# Patient Record
Sex: FEMALE | Race: WHITE | NOT HISPANIC OR LATINO | Employment: FULL TIME | URBAN - METROPOLITAN AREA
[De-identification: names, ages, dates, MRNs, and addresses within clinical notes are randomized per-mention and may not be internally consistent; named-entity substitution may affect disease eponyms.]

---

## 2023-08-11 ENCOUNTER — PRE VISIT (OUTPATIENT)
Dept: MATERNAL FETAL MEDICINE | Facility: CLINIC | Age: 33
End: 2023-08-11
Payer: COMMERCIAL

## 2023-08-11 ENCOUNTER — MEDICAL CORRESPONDENCE (OUTPATIENT)
Dept: HEALTH INFORMATION MANAGEMENT | Facility: CLINIC | Age: 33
End: 2023-08-11

## 2023-08-11 ENCOUNTER — HOSPITAL ENCOUNTER (OUTPATIENT)
Dept: ULTRASOUND IMAGING | Facility: CLINIC | Age: 33
Discharge: HOME | End: 2023-08-11
Attending: FAMILY MEDICINE
Payer: COMMERCIAL

## 2023-08-11 ENCOUNTER — TRANSCRIBE ORDERS (OUTPATIENT)
Dept: MATERNAL FETAL MEDICINE | Facility: CLINIC | Age: 33
End: 2023-08-11
Payer: COMMERCIAL

## 2023-08-11 ENCOUNTER — OFFICE VISIT (OUTPATIENT)
Dept: MATERNAL FETAL MEDICINE | Facility: CLINIC | Age: 33
End: 2023-08-11
Attending: FAMILY MEDICINE
Payer: COMMERCIAL

## 2023-08-11 DIAGNOSIS — O26.90 PREGNANCY RELATED CONDITION, ANTEPARTUM: Primary | ICD-10-CM

## 2023-08-11 DIAGNOSIS — O26.90 PREGNANCY RELATED CONDITION, ANTEPARTUM: ICD-10-CM

## 2023-08-11 DIAGNOSIS — O26.879 SHORT CERVIX AFFECTING PREGNANCY: Primary | ICD-10-CM

## 2023-08-11 PROCEDURE — 76817 TRANSVAGINAL US OBSTETRIC: CPT | Mod: 26 | Performed by: OBSTETRICS & GYNECOLOGY

## 2023-08-11 PROCEDURE — 76811 OB US DETAILED SNGL FETUS: CPT | Mod: 26 | Performed by: OBSTETRICS & GYNECOLOGY

## 2023-08-11 PROCEDURE — 76811 OB US DETAILED SNGL FETUS: CPT

## 2023-08-11 RX ORDER — PROGESTERONE 200 MG/1
200 CAPSULE ORAL DAILY
Qty: 30 CAPSULE | Refills: 0 | Status: SHIPPED | OUTPATIENT
Start: 2023-08-11 | End: 2023-09-10

## 2023-08-11 NOTE — PROGRESS NOTES
"Please see \"imaging\" tab under \"Chart Review\" for details of today's US at the [facility].    Ananth Childers MD  Maternal-Fetal Medicine    "

## 2023-08-11 NOTE — NURSING NOTE
Patient reports positive fetal movement, no pain, no contractions, leaking of fluid, or bleeding.   SBAR given to DAVID REECE, see their note in Epic.    Sterile cervical exam by Dr Childers, this RN present thoughout.

## 2023-08-14 ENCOUNTER — HOSPITAL ENCOUNTER (OUTPATIENT)
Dept: ULTRASOUND IMAGING | Facility: CLINIC | Age: 33
Discharge: HOME | End: 2023-08-14
Attending: OBSTETRICS & GYNECOLOGY
Payer: COMMERCIAL

## 2023-08-14 ENCOUNTER — OFFICE VISIT (OUTPATIENT)
Dept: MATERNAL FETAL MEDICINE | Facility: CLINIC | Age: 33
End: 2023-08-14
Attending: OBSTETRICS & GYNECOLOGY
Payer: COMMERCIAL

## 2023-08-14 ENCOUNTER — HOSPITAL ENCOUNTER (OUTPATIENT)
Facility: CLINIC | Age: 33
End: 2023-08-14
Admitting: STUDENT IN AN ORGANIZED HEALTH CARE EDUCATION/TRAINING PROGRAM
Payer: COMMERCIAL

## 2023-08-14 ENCOUNTER — ANESTHESIA (OUTPATIENT)
Dept: OBGYN | Facility: CLINIC | Age: 33
End: 2023-08-14
Payer: COMMERCIAL

## 2023-08-14 ENCOUNTER — ANESTHESIA EVENT (OUTPATIENT)
Dept: OBGYN | Facility: CLINIC | Age: 33
End: 2023-08-14
Payer: COMMERCIAL

## 2023-08-14 ENCOUNTER — HOSPITAL ENCOUNTER (OUTPATIENT)
Facility: CLINIC | Age: 33
Discharge: HOME | End: 2023-08-14
Attending: STUDENT IN AN ORGANIZED HEALTH CARE EDUCATION/TRAINING PROGRAM | Admitting: STUDENT IN AN ORGANIZED HEALTH CARE EDUCATION/TRAINING PROGRAM
Payer: COMMERCIAL

## 2023-08-14 VITALS
TEMPERATURE: 98.1 F | BODY MASS INDEX: 23.25 KG/M2 | HEART RATE: 64 BPM | DIASTOLIC BLOOD PRESSURE: 54 MMHG | SYSTOLIC BLOOD PRESSURE: 108 MMHG | OXYGEN SATURATION: 97 % | WEIGHT: 157 LBS | RESPIRATION RATE: 14 BRPM | HEIGHT: 69 IN

## 2023-08-14 DIAGNOSIS — O34.32 CERVICAL INSUFFICIENCY DURING PREGNANCY IN SECOND TRIMESTER, ANTEPARTUM: Primary | ICD-10-CM

## 2023-08-14 DIAGNOSIS — O26.879 SHORT CERVIX AFFECTING PREGNANCY: ICD-10-CM

## 2023-08-14 DIAGNOSIS — O26.879 SHORT CERVIX AFFECTING PREGNANCY: Primary | ICD-10-CM

## 2023-08-14 LAB
ABO/RH(D): ABNORMAL
ALBUMIN UR-MCNC: NEGATIVE MG/DL
ANTIBODY ID: NORMAL
ANTIBODY SCREEN: POSITIVE
ANTIBODY UNIDENTIFIED: NORMAL
APPEARANCE UR: CLEAR
BASOPHILS # BLD AUTO: 0 10E3/UL (ref 0–0.2)
BASOPHILS NFR BLD AUTO: 0 %
BILIRUB UR QL STRIP: NEGATIVE
C TRACH DNA SPEC QL PROBE+SIG AMP: NEGATIVE
CLUE CELLS: ABNORMAL
COLOR UR AUTO: NORMAL
EOSINOPHIL # BLD AUTO: 0.1 10E3/UL (ref 0–0.7)
EOSINOPHIL NFR BLD AUTO: 1 %
ERYTHROCYTE [DISTWIDTH] IN BLOOD BY AUTOMATED COUNT: 13 % (ref 10–15)
GLUCOSE UR STRIP-MCNC: NEGATIVE MG/DL
HCT VFR BLD AUTO: 34.1 % (ref 35–47)
HGB BLD-MCNC: 11.9 G/DL (ref 11.7–15.7)
HGB UR QL STRIP: NEGATIVE
IMM GRANULOCYTES # BLD: 0 10E3/UL
IMM GRANULOCYTES NFR BLD: 0 %
KETONES UR STRIP-MCNC: NEGATIVE MG/DL
LEUKOCYTE ESTERASE UR QL STRIP: NEGATIVE
LYMPHOCYTES # BLD AUTO: 1.2 10E3/UL (ref 0.8–5.3)
LYMPHOCYTES NFR BLD AUTO: 22 %
MCH RBC QN AUTO: 31.4 PG (ref 26.5–33)
MCHC RBC AUTO-ENTMCNC: 34.9 G/DL (ref 31.5–36.5)
MCV RBC AUTO: 90 FL (ref 78–100)
MONOCYTES # BLD AUTO: 0.4 10E3/UL (ref 0–1.3)
MONOCYTES NFR BLD AUTO: 8 %
N GONORRHOEA DNA SPEC QL NAA+PROBE: NEGATIVE
NEUTROPHILS # BLD AUTO: 3.7 10E3/UL (ref 1.6–8.3)
NEUTROPHILS NFR BLD AUTO: 69 %
NITRATE UR QL: NEGATIVE
NRBC # BLD AUTO: 0 10E3/UL
NRBC BLD AUTO-RTO: 0 /100
PH UR STRIP: 6.5 [PH] (ref 5–7)
PLATELET # BLD AUTO: 275 10E3/UL (ref 150–450)
RBC # BLD AUTO: 3.79 10E6/UL (ref 3.8–5.2)
RBC URINE: <1 /HPF
SP GR UR STRIP: 1.01 (ref 1–1.03)
SPECIMEN EXPIRATION DATE: ABNORMAL
SPECIMEN EXPIRATION DATE: NORMAL
SQUAMOUS EPITHELIAL: <1 /HPF
TRICHOMONAS, WET PREP: ABNORMAL
UROBILINOGEN UR STRIP-MCNC: NORMAL MG/DL
WBC # BLD AUTO: 5.4 10E3/UL (ref 4–11)
WBC URINE: <1 /HPF
WBC'S/HIGH POWER FIELD, WET PREP: ABNORMAL
YEAST, WET PREP: ABNORMAL

## 2023-08-14 PROCEDURE — 360N000074 HC SURGERY LEVEL 1, PER MIN: Performed by: STUDENT IN AN ORGANIZED HEALTH CARE EDUCATION/TRAINING PROGRAM

## 2023-08-14 PROCEDURE — 86870 RBC ANTIBODY IDENTIFICATION: CPT

## 2023-08-14 PROCEDURE — 86850 RBC ANTIBODY SCREEN: CPT

## 2023-08-14 PROCEDURE — 87210 SMEAR WET MOUNT SALINE/INK: CPT

## 2023-08-14 PROCEDURE — 59320 REVISION OF CERVIX: CPT | Mod: GC | Performed by: STUDENT IN AN ORGANIZED HEALTH CARE EDUCATION/TRAINING PROGRAM

## 2023-08-14 PROCEDURE — 258N000003 HC RX IP 258 OP 636: Performed by: STUDENT IN AN ORGANIZED HEALTH CARE EDUCATION/TRAINING PROGRAM

## 2023-08-14 PROCEDURE — 272N000001 HC OR GENERAL SUPPLY STERILE: Performed by: STUDENT IN AN ORGANIZED HEALTH CARE EDUCATION/TRAINING PROGRAM

## 2023-08-14 PROCEDURE — 76817 TRANSVAGINAL US OBSTETRIC: CPT | Mod: 26 | Performed by: OBSTETRICS & GYNECOLOGY

## 2023-08-14 PROCEDURE — 250N000013 HC RX MED GY IP 250 OP 250 PS 637

## 2023-08-14 PROCEDURE — 85025 COMPLETE CBC W/AUTO DIFF WBC: CPT

## 2023-08-14 PROCEDURE — 86901 BLOOD TYPING SEROLOGIC RH(D): CPT

## 2023-08-14 PROCEDURE — 59025 FETAL NON-STRESS TEST: CPT | Mod: 26 | Performed by: OBSTETRICS & GYNECOLOGY

## 2023-08-14 PROCEDURE — 87491 CHLMYD TRACH DNA AMP PROBE: CPT

## 2023-08-14 PROCEDURE — 370N000017 HC ANESTHESIA TECHNICAL FEE, PER MIN: Performed by: STUDENT IN AN ORGANIZED HEALTH CARE EDUCATION/TRAINING PROGRAM

## 2023-08-14 PROCEDURE — 99214 OFFICE O/P EST MOD 30 MIN: CPT | Mod: 25 | Performed by: OBSTETRICS & GYNECOLOGY

## 2023-08-14 PROCEDURE — 710N000010 HC RECOVERY PHASE 1, LEVEL 2, PER MIN: Performed by: STUDENT IN AN ORGANIZED HEALTH CARE EDUCATION/TRAINING PROGRAM

## 2023-08-14 PROCEDURE — 258N000003 HC RX IP 258 OP 636

## 2023-08-14 PROCEDURE — 250N000011 HC RX IP 250 OP 636: Performed by: STUDENT IN AN ORGANIZED HEALTH CARE EDUCATION/TRAINING PROGRAM

## 2023-08-14 PROCEDURE — 999N000141 HC STATISTIC PRE-PROCEDURE NURSING ASSESSMENT: Performed by: STUDENT IN AN ORGANIZED HEALTH CARE EDUCATION/TRAINING PROGRAM

## 2023-08-14 PROCEDURE — 76817 TRANSVAGINAL US OBSTETRIC: CPT

## 2023-08-14 PROCEDURE — 87591 N.GONORRHOEAE DNA AMP PROB: CPT

## 2023-08-14 PROCEDURE — 81001 URINALYSIS AUTO W/SCOPE: CPT

## 2023-08-14 PROCEDURE — 250N000013 HC RX MED GY IP 250 OP 250 PS 637: Performed by: STUDENT IN AN ORGANIZED HEALTH CARE EDUCATION/TRAINING PROGRAM

## 2023-08-14 PROCEDURE — 87086 URINE CULTURE/COLONY COUNT: CPT

## 2023-08-14 PROCEDURE — 99207 PR NO BILLABLE SERVICE THIS VISIT: CPT | Performed by: OBSTETRICS & GYNECOLOGY

## 2023-08-14 PROCEDURE — 250N000011 HC RX IP 250 OP 636: Mod: JZ | Performed by: STUDENT IN AN ORGANIZED HEALTH CARE EDUCATION/TRAINING PROGRAM

## 2023-08-14 RX ORDER — SODIUM CHLORIDE, SODIUM LACTATE, POTASSIUM CHLORIDE, CALCIUM CHLORIDE 600; 310; 30; 20 MG/100ML; MG/100ML; MG/100ML; MG/100ML
INJECTION, SOLUTION INTRAVENOUS CONTINUOUS
Status: CANCELLED | OUTPATIENT
Start: 2023-08-14

## 2023-08-14 RX ORDER — ONDANSETRON 2 MG/ML
INJECTION INTRAMUSCULAR; INTRAVENOUS PRN
Status: DISCONTINUED | OUTPATIENT
Start: 2023-08-14 | End: 2023-08-14

## 2023-08-14 RX ORDER — ACETAMINOPHEN 325 MG/1
650 TABLET ORAL EVERY 4 HOURS PRN
Status: DISCONTINUED | OUTPATIENT
Start: 2023-08-14 | End: 2023-08-14 | Stop reason: HOSPADM

## 2023-08-14 RX ORDER — BUPIVACAINE HYDROCHLORIDE 7.5 MG/ML
INJECTION, SOLUTION INTRASPINAL
Status: COMPLETED | OUTPATIENT
Start: 2023-08-14 | End: 2023-08-14

## 2023-08-14 RX ORDER — LIDOCAINE 40 MG/G
CREAM TOPICAL
Status: CANCELLED | OUTPATIENT
Start: 2023-08-14

## 2023-08-14 RX ORDER — SODIUM CHLORIDE, SODIUM LACTATE, POTASSIUM CHLORIDE, CALCIUM CHLORIDE 600; 310; 30; 20 MG/100ML; MG/100ML; MG/100ML; MG/100ML
INJECTION, SOLUTION INTRAVENOUS CONTINUOUS
Status: DISCONTINUED | OUTPATIENT
Start: 2023-08-14 | End: 2023-08-14 | Stop reason: HOSPADM

## 2023-08-14 RX ORDER — CITRIC ACID/SODIUM CITRATE 334-500MG
30 SOLUTION, ORAL ORAL ONCE
Status: COMPLETED | OUTPATIENT
Start: 2023-08-14 | End: 2023-08-14

## 2023-08-14 RX ORDER — ACETAMINOPHEN 325 MG/1
975 TABLET ORAL ONCE
Status: CANCELLED | OUTPATIENT
Start: 2023-08-14 | End: 2023-08-14

## 2023-08-14 RX ORDER — LIDOCAINE 40 MG/G
CREAM TOPICAL
Status: DISCONTINUED | OUTPATIENT
Start: 2023-08-14 | End: 2023-08-14 | Stop reason: HOSPADM

## 2023-08-14 RX ORDER — INDOMETHACIN 50 MG/1
50 CAPSULE ORAL ONCE
Status: COMPLETED | OUTPATIENT
Start: 2023-08-14 | End: 2023-08-14

## 2023-08-14 RX ORDER — CITRIC ACID/SODIUM CITRATE 334-500MG
30 SOLUTION, ORAL ORAL ONCE
Status: CANCELLED | OUTPATIENT
Start: 2023-08-14 | End: 2023-08-14

## 2023-08-14 RX ORDER — SODIUM CHLORIDE, SODIUM LACTATE, POTASSIUM CHLORIDE, CALCIUM CHLORIDE 600; 310; 30; 20 MG/100ML; MG/100ML; MG/100ML; MG/100ML
INJECTION, SOLUTION INTRAVENOUS
Status: COMPLETED
Start: 2023-08-14 | End: 2023-08-14

## 2023-08-14 RX ORDER — ACETAMINOPHEN 325 MG/1
975 TABLET ORAL ONCE
Status: COMPLETED | OUTPATIENT
Start: 2023-08-14 | End: 2023-08-14

## 2023-08-14 RX ORDER — ASPIRIN 81 MG/1
81 TABLET ORAL DAILY
Status: ON HOLD | COMMUNITY
End: 2023-11-23

## 2023-08-14 RX ADMIN — SODIUM CITRATE AND CITRIC ACID MONOHYDRATE 30 ML: 500; 334 SOLUTION ORAL at 12:02

## 2023-08-14 RX ADMIN — ACETAMINOPHEN 975 MG: 325 TABLET, FILM COATED ORAL at 11:56

## 2023-08-14 RX ADMIN — SODIUM CHLORIDE, SODIUM LACTATE, POTASSIUM CHLORIDE, CALCIUM CHLORIDE: 600; 310; 30; 20 INJECTION, SOLUTION INTRAVENOUS at 11:32

## 2023-08-14 RX ADMIN — INDOMETHACIN 50 MG: 50 CAPSULE ORAL at 17:36

## 2023-08-14 RX ADMIN — BUPIVACAINE HYDROCHLORIDE IN DEXTROSE 1.2 ML: 7.5 INJECTION, SOLUTION SUBARACHNOID at 13:42

## 2023-08-14 RX ADMIN — ACETAMINOPHEN 650 MG: 325 TABLET, FILM COATED ORAL at 16:11

## 2023-08-14 RX ADMIN — PHENYLEPHRINE HYDROCHLORIDE 50 MCG/MIN: 10 INJECTION INTRAVENOUS at 13:38

## 2023-08-14 RX ADMIN — SODIUM CHLORIDE, POTASSIUM CHLORIDE, SODIUM LACTATE AND CALCIUM CHLORIDE: 600; 310; 30; 20 INJECTION, SOLUTION INTRAVENOUS at 11:32

## 2023-08-14 RX ADMIN — ONDANSETRON 4 MG: 2 INJECTION INTRAMUSCULAR; INTRAVENOUS at 14:10

## 2023-08-14 ASSESSMENT — ACTIVITIES OF DAILY LIVING (ADL)
FALL_HISTORY_WITHIN_LAST_SIX_MONTHS: NO
CONCENTRATING,_REMEMBERING_OR_MAKING_DECISIONS_DIFFICULTY: NO
DOING_ERRANDS_INDEPENDENTLY_DIFFICULTY: NO
ADLS_ACUITY_SCORE: 31
ADLS_ACUITY_SCORE: 18
TOILETING_ISSUES: NO
ADLS_ACUITY_SCORE: 18
WALKING_OR_CLIMBING_STAIRS_DIFFICULTY: NO
ADLS_ACUITY_SCORE: 18
ADLS_ACUITY_SCORE: 18
WEAR_GLASSES_OR_BLIND: NO
DRESSING/BATHING_DIFFICULTY: NO
CHANGE_IN_FUNCTIONAL_STATUS_SINCE_ONSET_OF_CURRENT_ILLNESS/INJURY: NO
DIFFICULTY_EATING/SWALLOWING: NO

## 2023-08-14 NOTE — PROVIDER NOTIFICATION
08/14/23 1400   Output (mL)   Straight cath   (bladder emptied during cerclage procedure by providers.)

## 2023-08-14 NOTE — PROVIDER NOTIFICATION
08/14/23 1200   Fetal Assessment   Fetal HR Assessment Method intermittent auscultation, using Doppler;other (see comments)   Fetal HR (beats/min) 160     Used doptone to auscultate FHR 20.4 weeks prior to cerclage procedure

## 2023-08-14 NOTE — H&P
[facility] OB HISTORY AND PHYSICAL    Patient: Dina Pena   MRN#: [id number]  YOB: 1990     HPI: Dina Pena is a 33 year old  at 20w4d by 7w0d US who presents today from [facility] clinic following a repeat TVUS showing a 7mm cervical length. Dina has been placing progesterone vaginally daily since US showing short cervix on .    She reports good fetal movement. Denies LOF, vaginal bleeding, or contractions.      She denies fever, chills, headache, vision changes, SOB, chest pain, palpitations, nausea, emesis, RUQ pain, epigastric pain, dysuria, change in vaginal discharge, LE swelling/tenderness.     Pregnancy notable for:   - Cervical Insufficiency  - L4-L5 Lumbar fusion, received Epidural in labor    Her previous pregnancies were notable for a primary  section for fetal indications after progressing to complete dilation. The surgery was noted to be uncomplicated without any extensions.Denies history of postpartum hemorrhage, shoulder dystocia, pre-eclampsia.     No history of asthma or high blood pressure.    OBGYN History:    s/p  OB History    Para Term  AB Living   2 1 1 0 0 1   SAB IAB Ectopic Multiple Live Births   0 0 0 0 1      # Outcome Date GA Lbr Uli/2nd Weight Sex Delivery Anes PTL Lv   2 Current            1 Term 22 39w6d    CS-LTranv   KENNEDY     Prenatal Lab Results:  Lab Results   Component Value Date    HGB 14.0 2012       GBS Status:   No results found for: GBS    There are no problems to display for this patient.      Past Medical History  Past Medical History:   Diagnosis Date    Acquired spondylolisthesis     acquired lumbar    L4-L5 disc bulge     Spine fracture 10/2011     Past Surgical History  Past Surgical History:   Procedure Laterality Date    FUSION SPINE POSTERIOR ONE LEVEL  2011    L5-S1 fracture    OPEN REDUCTION INTERNAL FIXATION WRIST Left 2016    Procedure: OPEN REDUCTION INTERNAL FIXATION WRIST;   "Surgeon: Isra Hu MD;  Location: [facility] OR    THROAT SURGERY  2009    For abscess     Family History  Family History   Problem Relation Age of Onset    Heart Disease Paternal Grandmother         Heart disease    Heart Disease Paternal Grandfather         Heart disease    Heart Disease Paternal Grandmother     Heart Disease Paternal Grandfather      Social History  Social History     Tobacco Use    Smoking status: Never    Smokeless tobacco: Never   Substance Use Topics    Alcohol use: No     Medications  Medications Prior to Admission   Medication Sig Dispense Refill Last Dose    aspirin 81 MG EC tablet Take 81 mg by mouth daily   8/13/2023    hydrOXYzine (ATARAX) 25 MG tablet Take 1 tablet (25 mg) by mouth every 4 hours as needed (muscle spasms, nausea) 60 tablet 0 Unknown    oxyCODONE-acetaminophen (PERCOCET) 5-325 MG per tablet Take 1-2 tablets by mouth every 4 hours as needed for pain (moderate to severe) 80 tablet 0 Unknown    Prenatal Vit-Fe Fumarate-FA (PRENATAL MULTIVITAMIN  PLUS IRON) 27-1 MG TABS Take by mouth daily   8/13/2023    progesterone (PROMETRIUM) 200 MG capsule Place 1 capsule (200 mg) vaginally daily for 30 days 30 capsule 0 8/13/2023    senna-docusate (SENOKOT-S;PERICOLACE) 8.6-50 MG per tablet Take 1-2 tablets by mouth daily as needed for constipation 15 tablet 0 Unknown    SPIRONOLACTONE PO    Unknown     Allergies  Allergies   Allergen Reactions    Nka [No Known Allergies]         REVIEW OF SYSTEMS:  A 10 point review of systems was completed and was negative other than as noted in the HPI.    PHYSICAL EXAM  BP 99/54 (BP Location: Left arm, Patient Position: Semi-Stearns's, Cuff Size: Adult Regular)   Pulse 57   Temp 97.8  F (36.6  C) (Oral)   Resp 16   Ht 1.753 m (5' 9\")   LMP 03/23/2023   BMI 19.20 kg/m    Gen: NAD  CV: RRR, nl S1/S2, no murmurs/clicks/gallops  Lungs: CTAB, non-labored breathing  Abd: Gravid, non-tender, non-distended  Ext: trace peripheral extremity edema; "     SSE: Closed, No pooling or vaginal bleeding. White mucus around cervix  SVE: Fingertip  - Exam chaperoned by RN  Membranes: Intact    FHT:  Monitoring External  Doptones: 145  TOCO No contractions in 10 minutes    Studies: T&S, CBC, RPR, Wet prep, GC/CT, UA and Urine Culture.    Assessment & Plan: 33 year old  at 20w4d by 7w0d US admitted for ultrasound indicated cerclage after incidental finding of 10mm cervix on comprehensive scan in [facility].  Pregnancy is notable for a history of .     # Cervical Insufficiency  We reviewed the findings of her ultrasound and speculum exam. We confirmed that she has no prior history of  delivery, has not had any cervical procedures and had no cervical extensions during her primary  section. She has no si/sx of  labor or overt infection. We discussed that her ultrasound and exam findings are very concerning and put her at significant risk for  delivery. We reviewed her current usage of vaginal progesterone and recommendation for US indicated cerclage based on best available evidence. We reviewed that although this may be her best chance at pregnancy prolongation this management option also carries the most risk. We reviewed that ultrasound indicated cerclage can be complicated by infection, bleeding, premature rupture of membranes, and failure or inability to perform the procedure. We discussed that even in the setting of a successful cerclage placement she could still have a previable or  delivery. Finally, we reviewed that if rupture or progression in her cervical dilation were to occur and she were to deliver at 20w4d weeks there would be nothing we could offer to her in terms of  resuscitation. We briefly reviewed that the absolute earliest intervention would be possible is 22 weeks however based on outcomes although intervention is possible it is not always what families choose.     Based on our entire discussion she is  interested in proceeding with ultrasound indicated cerclage placement. Plan to admit to L&D, NPO with maintenance IV fluids.    Plan for Discharge home following post operative goals.    Patient discussed with Dr. Anna Kern MD, MPH  Ob/Gyn Resident, PGY-3  08/14/23 10:45 AM

## 2023-08-14 NOTE — ANESTHESIA CARE TRANSFER NOTE
Patient: Dina Pena    Procedure: Procedure(s):  Cerclage cervical       Diagnosis: * No pre-op diagnosis entered *  Diagnosis Additional Information: No value filed.    Anesthesia Type:   Spinal     Note:    Oropharynx: oropharynx clear of all foreign objects and spontaneously breathing  Level of Consciousness: awake  Oxygen Supplementation: room air    Independent Airway: airway patency satisfactory and stable  Dentition: dentition unchanged  Vital Signs Stable: post-procedure vital signs reviewed and stable  Report to RN Given: handoff report given  Patient transferred to: PACU    Handoff Report: Identifed the Patient, Identified the Reponsible Provider, Reviewed the pertinent medical history, Discussed the surgical course, Reviewed Intra-OP anesthesia mangement and issues during anesthesia, Set expectations for post-procedure period and Allowed opportunity for questions and acknowledgement of understanding      Vitals:  Vitals Value Taken Time   BP 99/55 08/14/23 1436   Temp     Pulse 54 08/14/23 1447   Resp 22 08/14/23 1447   SpO2 100 % 08/14/23 1447   Vitals shown include unvalidated device data.    Electronically Signed By: Patrice Patiño MD  August 14, 2023  2:48 PM

## 2023-08-14 NOTE — PROVIDER NOTIFICATION
08/14/23 1530   Fetal Assessment   Fetal Movement active   Fetal HR Assessment Method other (see comments)   Fetal HR (beats/min) 160     Used doptone to auscultate FHR post cerclage procedure.

## 2023-08-14 NOTE — DISCHARGE SUMMARY
[facility] Discharge Summary    Dina Pena MRN# [id number]   Age: 33 year old YOB: 1990     Date of Admission:  8/14/2023  Date of Discharge::  8/14/2023   Admitting Physician:  Edna Castillo MD  Discharge Physician:  Radha Vuong MD            Admission Diagnoses:   1) Intrauterine pregnancy at 20w4d  2) Cervical insufficiency, cervical length measuring 7 mm on ultrasound           Discharge Diagnosis:   Same            Procedures:     Procedure(s):  Exam indicated Landin's cerclage, 1 stitch at the 12 o'clock position       No other procedures performed during this admission           Medications Prior to Admission:     Medications Prior to Admission   Medication Sig Dispense Refill Last Dose    aspirin 81 MG EC tablet Take 81 mg by mouth daily   8/13/2023    hydrOXYzine (ATARAX) 25 MG tablet Take 1 tablet (25 mg) by mouth every 4 hours as needed (muscle spasms, nausea) 60 tablet 0 Unknown    oxyCODONE-acetaminophen (PERCOCET) 5-325 MG per tablet Take 1-2 tablets by mouth every 4 hours as needed for pain (moderate to severe) 80 tablet 0 Unknown    Prenatal Vit-Fe Fumarate-FA (PRENATAL MULTIVITAMIN  PLUS IRON) 27-1 MG TABS Take by mouth daily   8/13/2023    progesterone (PROMETRIUM) 200 MG capsule Place 1 capsule (200 mg) vaginally daily for 30 days 30 capsule 0 8/13/2023    senna-docusate (SENOKOT-S;PERICOLACE) 8.6-50 MG per tablet Take 1-2 tablets by mouth daily as needed for constipation 15 tablet 0 Unknown    [DISCONTINUED] SPIRONOLACTONE PO    Unknown             Discharge Medications:     Current Discharge Medication List        CONTINUE these medications which have NOT CHANGED    Details   aspirin 81 MG EC tablet Take 81 mg by mouth daily      hydrOXYzine (ATARAX) 25 MG tablet Take 1 tablet (25 mg) by mouth every 4 hours as needed (muscle spasms, nausea)  Qty: 60 tablet, Refills: 0    Associated Diagnoses: Other closed extra-articular fracture of distal end of left  radius with malunion      oxyCODONE-acetaminophen (PERCOCET) 5-325 MG per tablet Take 1-2 tablets by mouth every 4 hours as needed for pain (moderate to severe)  Qty: 80 tablet, Refills: 0    Associated Diagnoses: Other closed extra-articular fracture of distal end of left radius with malunion      Prenatal Vit-Fe Fumarate-FA (PRENATAL MULTIVITAMIN  PLUS IRON) 27-1 MG TABS Take by mouth daily      progesterone (PROMETRIUM) 200 MG capsule Place 1 capsule (200 mg) vaginally daily for 30 days  Qty: 30 capsule, Refills: 0    Associated Diagnoses: Short cervix affecting pregnancy      senna-docusate (SENOKOT-S;PERICOLACE) 8.6-50 MG per tablet Take 1-2 tablets by mouth daily as needed for constipation  Qty: 15 tablet, Refills: 0    Comments: While on narcotics  Associated Diagnoses: Other closed extra-articular fracture of distal end of left radius with malunion           STOP taking these medications       SPIRONOLACTONE PO Comments:   Reason for Stopping:                     Consultations:   No consultations were requested during this admission          Brief History of Illness:   Refer to H&P for further details           Hospital Course:     The patient underwent a Landin cerclage. Post-operatively, she recovered well in the postoperative recovery area without issues. Fetal Doptone was done confirming fetal viability.   She was discharged home in stable condition.            Discharge Instructions and Follow-Up:     Discharge diet: Regular   Discharge activity: Activity as tolerated. Pelvic rest.    Discharge follow-up: Follow up with primary OB/Gyn provider in 1-2 weeks.    Wound care N/A           Discharge Disposition:     Discharged to home      Attestation:  I have reviewed today's vital signs, notes, medications, labs and imaging.    Salbador Leggett MD

## 2023-08-14 NOTE — NURSING NOTE
D: Patient given directions to L&D left clinic ambulatory. L&D charge and Dr. Castillo aware that patient is coming.   Lizbet Childers RN     Have You Had Botox Before?: has had botox When Was Your Last Botox Treatment?: 11/11/2021

## 2023-08-14 NOTE — DISCHARGE INSTRUCTIONS
Discharge Instructions for Undelivered Patients    Diet:  * Drink 8 to 12 glasses of liquids (milk, juice, water) every day  * You may eat meals and snacks.    Activity:  * Rest the pelvic area. No sex.    Call your provider if you notice:  * Swelling in your face or increased swelling in your hands or legs.  * Headaches that are not relieved by Tylenol (acetaminophen).  * Changes in your vision (blurring; seeing spots or stars).  * Nausea (sick to your stomach) and vomiting (throwing up).  * Weight gain of 5 pounds per week.  * Heartburn that doesn't go away.  * Signs of bladder infection: Pain when you urinate (use the toilet), needing to go more often or more urgently.  * The bag of kaufman (membrane) breaks, or you notice leaking in your underwear.  * Bright red blood in your underwear.  * Abdominal (lower belly) or stomach pain.  * For first baby: Contractions (tightenings) less than 5 minutes apart for one hour or more.  * Second (plus) baby: Contractions (tightenings) less than 10 minutes apart and getting stronger.  * Increase or change in vaginal discharge (note the color and amount).    ***

## 2023-08-14 NOTE — ANESTHESIA PREPROCEDURE EVALUATION
Anesthesia Pre-Procedure Evaluation    Patient: Dina Pena   MRN: [id number] : 1990        Procedure : Procedure(s):  Cerclage cervical          Past Medical History:   Diagnosis Date    Acquired spondylolisthesis     acquired lumbar    L4-L5 disc bulge     Spine fracture 10/2011      Past Surgical History:   Procedure Laterality Date    FUSION SPINE POSTERIOR ONE LEVEL  2011    L5-S1 fracture    OPEN REDUCTION INTERNAL FIXATION WRIST Left 2016    Procedure: OPEN REDUCTION INTERNAL FIXATION WRIST;  Surgeon: Isra Hu MD;  Location: [facility] OR    THROAT SURGERY      For abscess      Allergies   Allergen Reactions    Nka [No Known Allergies]       Social History     Tobacco Use    Smoking status: Never    Smokeless tobacco: Never   Substance Use Topics    Alcohol use: No      Wt Readings from Last 1 Encounters:   16 59 kg (130 lb)        Anesthesia Evaluation   Pt has had prior anesthetic. Type: General and OB Labor Epidural.    No history of anesthetic complications       ROS/MED HX  ENT/Pulmonary:  - neg pulmonary ROS  (-) asthma   Neurologic: Comment: # hx one level lumbar spinal fusion, believes epidural was placed above this      Cardiovascular:  - neg cardiovascular ROS  (-) PIH   METS/Exercise Tolerance:     Hematologic: Comments: Started ASA 81 and progesterone ~ 1w ago    (+)   no coagulopathy,             Musculoskeletal:   (+)         lumbar spine surgery,      GI/Hepatic:    (-) GERD   Renal/Genitourinary:       Endo:  - neg endo ROS     Psychiatric/Substance Use:       Infectious Disease:       Malignancy:       Other:    at 20w4d  Epidural to Csx prior delivery with n/v   (+)  , ,previous          Physical Exam    Airway        Mallampati: I   TM distance: > 3 FB   Neck ROM: full   Mouth opening: > 3 cm    Respiratory Devices and Support         Dental  no notable dental history     (+) Completely normal teeth      Cardiovascular    cardiovascular exam normal          Pulmonary   pulmonary exam normal                OUTSIDE LABS:  CBC:   Lab Results   Component Value Date    WBC 6.3 12/18/2012    WBC 6.1 10/17/2012    HGB 14.0 12/18/2012    HGB 12.6 10/17/2012    HCT 41.0 12/18/2012    HCT 37.8 10/17/2012     12/18/2012     10/17/2012     BMP:   Lab Results   Component Value Date     12/18/2012     10/17/2012    POTASSIUM 4.0 12/18/2012    POTASSIUM 4.2 10/17/2012    CHLORIDE 102 12/18/2012    CHLORIDE 105 10/17/2012    CO2 25 12/18/2012    CO2 24 10/17/2012    BUN 8 12/18/2012    BUN 18 10/17/2012    CR 0.67 12/18/2012    CR 0.72 10/17/2012    GLC 76 12/18/2012    GLC 92 10/17/2012     COAGS:   Lab Results   Component Value Date    INR 0.98 10/17/2012     POC:   Lab Results   Component Value Date    HCG Negative 04/27/2016     HEPATIC: No results found for: ALBUMIN, PROTTOTAL, ALT, AST, GGT, ALKPHOS, BILITOTAL, BILIDIRECT, MARC  OTHER:   Lab Results   Component Value Date    LAURA 9.0 12/18/2012       Anesthesia Plan    ASA Status:  2    NPO Status:  ELEVATED Aspiration Risk/Unknown    Anesthesia Type: Spinal.              Consents    Anesthesia Plan(s) and associated risks, benefits, and realistic alternatives discussed. Questions answered and patient/representative(s) expressed understanding.     - Discussed: Risks, Benefits and Alternatives for BOTH SEDATION and the PROCEDURE were discussed     - Discussed with:  Patient, Spouse      - Extended Intubation/Ventilatory Support Discussed: No.      - Patient is DNR/DNI Status: No     Use of blood products discussed: No .     Postoperative Care    Pain management: Neuraxial analgesia.        Comments:           H&P reviewed: Unable to attach VIRTUAL H&P to encounter due to EHR limitations. Appropriate H&P reviewed. The physical exam performed by anesthesia during this surgical encounter serves as the physical portion of that virtual H&P.  Any significant changes noted  within this preop evaluation.          Patrice Patiño MD

## 2023-08-14 NOTE — OP NOTE
Operative Note: Cervical Cerclage         Pre-Op Diagnosis:   1) Single intrauterine pregnancy at 20w4d by LMP consistent with 7w0d  2) Cervical insufficiency by ultrasound and physical examination          Post-Op Diagnosis:   1) Same         Procedure:   1) Landin's cervical cerclage, #2 Ethilon sutures (knot tied at 12 o'clock position)         Surgeons:   Attending: Radha Vuong MD  Fellow: Salbador Leggett MD, MD  Medical Student: Re Ritter MS4          Anesthesia:   Spinal          Estimated Blood Loss:   5cc         Findings:   1) Cervix fingertip dilated prior to the procedure, closed following the procedure  2) Fetal cardiac activity confirmed by bedside ultrasound at 152 beats per minute         Specimens:   1) None         Complications:   1) None apparent          History:     Dina Pena is a 33 year oldy.o.  at 20w4d by LMP consistent with 7w0d ultrasound who presents for ultrasound/exam indicated cerclage for cervical insufficiency with cervical length measuring 7 mm.   After counseling regarding these findings, the patient has decided to proceed with exam indicated cerclage placement.         Details of Procedure:     After administration of spinal anesthesia the patient was placed in the dorsal lithotomy position and prepped and draped in the usual fashion.  A weighted speculum was placed into the vagina and right angle retractors were used to visualize the cervix. The cervix appeared visually closed. The vagina and cervix were copiously cleansed with betadine solution. The bladder was then straight catheterized. Next, a circumferential suture of #2 Ethilon was placed in the usual fashion at the cervicovaginal reflection with knot tied at 12 o'clock position.    Both sutures were securely tied down and digital exam confirmed that the cervix was closed.  The stay suture was then removed, the cervix was examined and found to be tightly closed.     The bladder was drained  of clear urine and a rectal exam confirmed that no sutures were present in the rectum. The cervix and vaginal vault were inspected and noted to be free of injury and hemostatic.      The instruments were removed from the vagina. She tolerated her spinal anesthesia well without incident. She was subsequently transferred to the recovery room in satisfactory condition.     Sponge and needle counts were correct at the close of the case x 2.    Salbador Leggett MD  Maternal Fetal Medicine Fellow  8/14/2023 2:49 PM

## 2023-08-14 NOTE — ANESTHESIA POSTPROCEDURE EVALUATION
Patient: Dina Pena    Procedure: Procedure(s):  Cerclage cervical       Anesthesia Type:  Spinal    Note:  Disposition: Inpatient   Postop Pain Control: Uneventful            Sign Out: Well controlled pain   PONV: No   Neuro/Psych: Uneventful            Sign Out: Acceptable/Baseline neuro status   Airway/Respiratory: Uneventful            Sign Out: Acceptable/Baseline resp. status   CV/Hemodynamics: Uneventful            Sign Out: Acceptable CV status; No obvious hypovolemia; No obvious fluid overload   Other NRE: NONE   DID A NON-ROUTINE EVENT OCCUR? No           Last vitals:  Vitals Value Taken Time   /63 08/14/23 1529   Temp 36.7  C (98.1  F) 08/14/23 1445   Pulse 63 08/14/23 1531   Resp 15 08/14/23 1531   SpO2 98 % 08/14/23 1531   Vitals shown include unvalidated device data.    Electronically Signed By: Patrice Patiño MD  August 14, 2023  3:48 PM

## 2023-08-14 NOTE — PLAN OF CARE
Pt transferred to  456 from PACU following cerclage procedure. Vitals stable. No bleeding. . Pt able to walk with assistance. Pt ate dinner without nausea or vomiting. Unable to void. Straight cathed for 200 ml. Pt states her pain has worsened since her spinal has worn off. Pt received Indomethacin. Will continue to monitor for pain and ability to urinate.

## 2023-08-14 NOTE — ANESTHESIA PROCEDURE NOTES
"Intrathecal injection Procedure Note    Pre-Procedure   Staff -        Anesthesiologist:  Radha Christian MD       Resident/Fellow: Patrice Patiño MD       Performed By: resident       Location: OB       Pre-Anesthestic Checklist: patient identified, IV checked, risks and benefits discussed, informed consent, monitors and equipment checked, pre-op evaluation, at physician/surgeon's request and post-op pain management  Timeout:       Correct Patient: Yes        Correct Procedure: Yes        Correct Site: Yes        Correct Position: Yes   Procedure Documentation  Procedure: intrathecal injection       Patient Position: sitting       Patient Prep/Sterile Barriers: sterile gloves, mask, patient draped       Skin prep: Chloraprep       Insertion Site: L2-3. (midline approach).       Needle Gauge: 25.        Needle Length (Inches): 3.5        Spinal Needle Type: Pencan       Introducer used       Introducer: 20 G       # of attempts: 1 and  # of redirects:  0    Assessment/Narrative         Paresthesias: No.       CSF fluid: clear.    Medication(s) Administered   0.75% Hyperbaric Bupivacaine (Intrathecal) - Intrathecal   1.2 mL - 8/14/2023 1:42:00 PM    FOR [facility] ([facility]) ONLY:   Pain Team Contact information: please page the Pain Team [address]. Search \"Pain\". During daytime hours, please page the attending first. At night please page the resident first.      "

## 2023-08-15 LAB — BACTERIA UR CULT: NORMAL

## 2023-08-15 NOTE — PLAN OF CARE
Pain improved after indomethacin. Pt able to void. Discharge instructions explained and understood. Discharge to home.

## 2023-08-28 ENCOUNTER — TRANSFERRED RECORDS (OUTPATIENT)
Dept: HEALTH INFORMATION MANAGEMENT | Facility: CLINIC | Age: 33
End: 2023-08-28
Payer: COMMERCIAL

## 2023-08-29 ENCOUNTER — HOSPITAL ENCOUNTER (EMERGENCY)
Facility: CLINIC | Age: 33
End: 2023-08-29
Admitting: OBSTETRICS & GYNECOLOGY
Payer: COMMERCIAL

## 2023-08-29 ENCOUNTER — HOSPITAL ENCOUNTER (EMERGENCY)
Facility: CLINIC | Age: 33
Discharge: HOME | End: 2023-08-29
Attending: OBSTETRICS & GYNECOLOGY | Admitting: OBSTETRICS & GYNECOLOGY
Payer: COMMERCIAL

## 2023-08-29 VITALS — RESPIRATION RATE: 16 BRPM | SYSTOLIC BLOOD PRESSURE: 104 MMHG | DIASTOLIC BLOOD PRESSURE: 56 MMHG | TEMPERATURE: 97.9 F

## 2023-08-29 LAB
ALBUMIN UR-MCNC: NEGATIVE MG/DL
APPEARANCE UR: CLEAR
BILIRUB UR QL STRIP: NEGATIVE
C TRACH DNA SPEC QL PROBE+SIG AMP: NEGATIVE
CLUE CELLS: NORMAL
COLOR UR AUTO: ABNORMAL
ERYTHROCYTE [DISTWIDTH] IN BLOOD BY AUTOMATED COUNT: 13.2 % (ref 10–15)
GLUCOSE UR STRIP-MCNC: NEGATIVE MG/DL
HCT VFR BLD AUTO: 34.5 % (ref 35–47)
HGB BLD-MCNC: 12.1 G/DL (ref 11.7–15.7)
HGB UR QL STRIP: NEGATIVE
KETONES UR STRIP-MCNC: NEGATIVE MG/DL
LEUKOCYTE ESTERASE UR QL STRIP: NEGATIVE
MCH RBC QN AUTO: 31.7 PG (ref 26.5–33)
MCHC RBC AUTO-ENTMCNC: 35.1 G/DL (ref 31.5–36.5)
MCV RBC AUTO: 90 FL (ref 78–100)
N GONORRHOEA DNA SPEC QL NAA+PROBE: NEGATIVE
NITRATE UR QL: NEGATIVE
PH UR STRIP: 7.5 [PH] (ref 5–7)
PLATELET # BLD AUTO: 195 10E3/UL (ref 150–450)
RBC # BLD AUTO: 3.82 10E6/UL (ref 3.8–5.2)
RBC URINE: 1 /HPF
SP GR UR STRIP: 1 (ref 1–1.03)
SQUAMOUS EPITHELIAL: 1 /HPF
TRICHOMONAS, WET PREP: NORMAL
UROBILINOGEN UR STRIP-MCNC: NORMAL MG/DL
WBC # BLD AUTO: 7.1 10E3/UL (ref 4–11)
WBC URINE: <1 /HPF
WBC'S/HIGH POWER FIELD, WET PREP: NORMAL
YEAST, WET PREP: NORMAL

## 2023-08-29 PROCEDURE — 87210 SMEAR WET MOUNT SALINE/INK: CPT

## 2023-08-29 PROCEDURE — 258N000003 HC RX IP 258 OP 636

## 2023-08-29 PROCEDURE — 999N000105 HC STATISTIC NO DOCUMENTATION TO SUPPORT CHARGE

## 2023-08-29 PROCEDURE — G0463 HOSPITAL OUTPT CLINIC VISIT: HCPCS | Mod: 25

## 2023-08-29 PROCEDURE — 87591 N.GONORRHOEAE DNA AMP PROB: CPT

## 2023-08-29 PROCEDURE — 59025 FETAL NON-STRESS TEST: CPT | Mod: 26 | Performed by: OBSTETRICS & GYNECOLOGY

## 2023-08-29 PROCEDURE — 87491 CHLMYD TRACH DNA AMP PROBE: CPT

## 2023-08-29 PROCEDURE — 87653 STREP B DNA AMP PROBE: CPT

## 2023-08-29 PROCEDURE — 99214 OFFICE O/P EST MOD 30 MIN: CPT | Mod: 25 | Performed by: OBSTETRICS & GYNECOLOGY

## 2023-08-29 PROCEDURE — 85014 HEMATOCRIT: CPT

## 2023-08-29 PROCEDURE — 81001 URINALYSIS AUTO W/SCOPE: CPT

## 2023-08-29 PROCEDURE — 96360 HYDRATION IV INFUSION INIT: CPT

## 2023-08-29 PROCEDURE — 999N000104 HC STATISTIC NO CHARGE

## 2023-08-29 RX ORDER — METRONIDAZOLE 500 MG/1
500 TABLET ORAL 2 TIMES DAILY
Status: ON HOLD | COMMUNITY
End: 2023-09-07

## 2023-08-29 RX ORDER — SODIUM CHLORIDE 9 MG/ML
INJECTION, SOLUTION INTRAVENOUS
Status: COMPLETED
Start: 2023-08-29 | End: 2023-08-29

## 2023-08-29 RX ADMIN — SODIUM CHLORIDE 1000 ML: 9 INJECTION, SOLUTION INTRAVENOUS at 07:10

## 2023-08-29 ASSESSMENT — ACTIVITIES OF DAILY LIVING (ADL)
ADLS_ACUITY_SCORE: 31
ADLS_ACUITY_SCORE: 31

## 2023-08-29 NOTE — PROGRESS NOTES
[facility]  Labor & Delivery Triage Note    HPI: Dina Pena is a 33 year old  at 22w5d by 7w0d US who presents today for evaluation of uterine cramping. Patient reports having an onset of contractions around 0300 this morning that woke her up from sleep. States that she got out of bed, tried to drink water, rub her lower abdomen, and stay seated.  None of these measures helped to resolve her symptoms. Given this she decided to present to triage area for evaluation.     Currently patient states that she continues to feel uterine contractions. She reports feeling good fetal movement - more than baseline. She reports being diagnosed yesterday with BV after noticing increased discharge/fluid over the weekend. She was started on Flagyl 500 BID of which she took one dose last night. She denies nelsy LOF, or vaginal bleeding. No other complaints at this time.     Pregnancy notable for:  - Cervical Insufficiency, s/p Landin cerclage   - L4/L5 fusion  - Positive blood antibody, unidentified    PNC labs:  Lab Results   Component Value Date    AS Positive (A) 2023    HGB 11.9 2023     GBS status: n/a    OBHX:  OB History    Para Term  AB Living   2 1 1 0 0 1   SAB IAB Ectopic Multiple Live Births   0 0 0 0 1      # Outcome Date GA Lbr Uli/2nd Weight Sex Delivery Anes PTL Lv   2 Current            1 Term 22 39w6d    CS-LTranv   KENNEDY     Medical Hx:  Past Medical History:   Diagnosis Date    Acquired spondylolisthesis     acquired lumbar    L4-L5 disc bulge     Spine fracture 10/2011     Surgical Hx:  Past Surgical History:   Procedure Laterality Date    CERCLAGE CERVICAL N/A 2023    Procedure: Cerclage cervical;  Surgeon: Radha Vuong MD;  Location: [facility] L[facility]    FUSION SPINE POSTERIOR ONE LEVEL  2011    L5-S1 fracture    OPEN REDUCTION INTERNAL FIXATION WRIST Left 2016    Procedure: OPEN REDUCTION INTERNAL FIXATION  WRIST;  Surgeon: Isra Hu MD;  Location: [facility] OR    THROAT SURGERY  2009    For abscess       Medications:  Current Outpatient Medications   Medication Instructions    aspirin 81 mg, Oral, DAILY    hydrOXYzine (ATARAX) 25 mg, Oral, EVERY 4 HOURS PRN    metroNIDAZOLE (FLAGYL) 500 mg, Oral, 2 TIMES DAILY    oxyCODONE-acetaminophen (PERCOCET) 5-325 MG per tablet 1-2 tablets, Oral, EVERY 4 HOURS PRN    Prenatal Vit-Fe Fumarate-FA (PRENATAL MULTIVITAMIN  PLUS IRON) 27-1 MG TABS Oral, DAILY    progesterone (PROMETRIUM) 200 mg, Vaginal, DAILY    senna-docusate (SENOKOT-S;PERICOLACE) 8.6-50 MG per tablet 1-2 tablets, Oral, DAILY PRN       Allergies:  Allergies   Allergen Reactions    Blood Transfusion Related (Informational Only) Other (See Comments)     Patient has a history of a clinically significant antibody against RBC antigens.  A delay in compatible RBCs may occur.    Nka [No Known Allergies]        FMHx:    Family History   Problem Relation Age of Onset    Heart Disease Paternal Grandmother         Heart disease    Heart Disease Paternal Grandfather         Heart disease    Heart Disease Paternal Grandmother     Heart Disease Paternal Grandfather        Social Hx:  Social History     Tobacco Use    Smoking status: Never    Smokeless tobacco: Never   Substance Use Topics    Alcohol use: No    Drug use: Never     ROS: 10-point ROS negative except as in HPI.    Physical Exam:  Vitals:    08/29/23 0535   BP: 104/59   BP Location: Right arm   Patient Position: Semi-Stearns's   Cuff Size: Adult Regular   Resp: 18   Temp: 97.4  F (36.3  C)   TempSrc: Oral     GEN: resting comfortably in bed, NAD   CV: Regular rate, well perfused  PULM: On room air, no increased work of breathing  ABD: soft, gravid, non-tender, non-distended  EXT: no BLE edema, non tender to palpation  SSE: Landin cerclage knot visualized at the 12 o'clock position. Copious amount of yellow/green discharge and mucus noted at the cervical os.  Moderate amount of thin white discharge noted in the vaginal vault. Does not appear to be consistent with nelsy leakage of amniotic fluid.  SVE: c/l/h    NST:  FHT: baseline 140, moderate variability, + accels, no decels  TOCO: uterine irritability ctx in ten minutes    A/P: 33 year old  at 22w5d by 7w1d US, here for evaluation of uterine cramps that began around 0300 this morning.  Patient's pregnancy is notable for cervical insufficiency status post Landin cerclage placement on 2023.  Patient is hemodynamically stable, afebrile.  SSE notable for moderate amount of yellow/green discharge intermixed with mucus noted at the cervical os.  Moderate amount of thin white discharge noted in the vaginal vault.  Swabs obtained.  SVE with sterile glove notable for cervix that is closed/long/high.  We will plan for prolonged monitoring of patient on tocometry.  Swabs, UA, CBC obtained on initial examination of patient.  Will plan to administer fluid bolus.    [facility]  Patient discussed with Dr. Chase Cole DO, MS  Obstetrics, Gynecology & Women's Health   Resident, PGY-3  2023 6:01 AM    Addendum:    Received signout from outgoing team. Contractions have improved with IVF. Denies vaginal bleeding and loss of fluid. Hemodynamically stable. On SSE: no bleeding, fluid noted, cerclage suture noted at 12 o'clock. Thick cervical mucus noted form cervical os. No obvious concerns for infections. FHT: reassuring, appropriate for gestational age. After discussion of evaluation and low concern for infection and  labor and reassurance provided. Patient agreeable to discharge.    Discussed with Dr. Stone.    Heri Kern MD, MPH  Ob/Gyn Resident, PGY-3  23 10:14 AM        Physician Attestation   I saw this patient with the resident and agree with the resident/fellow's findings and plan of care as documented in the note.      Key findings: 34 yo   at 22w 5d here for evaluation of  contractions. Parish has a history of cervical shortening with dilation to FT in the mid-trimester of pregnancy. She underwent an exam indicated cerclage by Dr. Vuong at 20w 4d on 2023. She was evaluated for vaginal discharge over the weekend and was diagnosed with BV and started on Flagyl. Today she is experiencing  contractions. She received a fluid bolus and notes that the contractions have largely resolved after IVF. Speculum exam repeated prior to discharge and the cervix appeared closed with cerclage suture in place. There was normal mucous like discharge from the external os of the cervix. Normal WBC, negative wet prep and negative GC/Chlam. GBS pending.    We discussed that there is no concern for  labor or infection on today's evaluation. Plan to discharge home with instructions to return if having regular contractions, leakage of fluid, vaginal bleeding, or fever or chills.    35 MINUTES SPENT BY ME on the date of service doing chart review, history, exam, documentation & further activities per the note.    Diamond Stone MD  Date of Service (when I saw the patient): 23

## 2023-08-29 NOTE — PROGRESS NOTES
Data: [facility] at 05[facility].   Reason for maternal/fetal assessment per patient is r/o  labor.   . Patient is a . Prenatal record reviewed.      OB History    Para Term  AB Living   2 1 1 0 0 1   SAB IAB Ectopic Multiple Live Births   0 0 0 0 1      # Outcome Date GA Lbr Uli/2nd Weight Sex Delivery Anes PTL Lv   2 Current            1 Term 22 39w6d    CS-LTranv   KENNEDY      Medical History:   Past Medical History:   Diagnosis Date    Acquired spondylolisthesis     acquired lumbar    L4-L5 disc bulge     Spine fracture 10/2011   . Gestational Age 22w5d. VSS. Cervix: closed with cerclage in place.  Fetal movement present. Patient denies backache, pelvic pressure, UTI symptoms, GI problems, bloody show, vaginal bleeding, edema, headache, visual disturbances, epigastric or URQ pain, abdominal pain, rupture of membranes. Support persons, , at home with daughter.   Action: Verbal consent for EFM. Triage assessment completed. EFM applied.  Fetal assessment as charted in flowsheets. Patient instructed to report change in fetal movement, vaginal leaking of fluid or bleeding, abdominal pain, or any concerns related to the pregnancy to her nurse/physician.   Response: Dr. Cole informed of pt arrival, cramping, and recent diagnosis of BV. Plan per provider is 1000mL fluid bolus and extended monitoring. Swab collection completed. Patient verbalized understanding of education and verbalized agreement with plan.     IV start time: 0710

## 2023-08-29 NOTE — DISCHARGE INSTRUCTIONS
Discharge Instruction for Undelivered Patients      You were seen for: Labor Assessment  We Consulted: Dr. Stone  You had (Test or Medicine):fetal monitoring, labs, cervical exam     Diet:   Drink 8 to 12 glasses of liquids (milk, juice, water) every day.  You may eat meals and snacks.     Activity:  Rest the pelvic area. No sex. Do not stimulate breasts or nipples.  Count fetal kicks everyday (see handout)     Call your provider if you notice:  Swelling in your face or increased swelling in your hands or legs.  Headaches that are not relieved by Tylenol (acetaminophen).  Changes in your vision (blurring: seeing spots or stars.)  Nausea (sick to your stomach) and vomiting (throwing up).   Weight gain of 5 pounds or more per week.  Heartburn that doesn't go away.  Signs of bladder infection: pain when you urinate (use the toilet), need to go more often and more urgently.  The bag of kaufman (rupture of membranes) breaks, or you notice leaking in your underwear.  Bright red blood in your underwear.  Abdominal (lower belly) or stomach pain.  *If less than 34 weeks: Contractions (tightening) more than 6 times in one hour.  Increase or change in vaginal discharge (note the color and amount)    Follow-up:  As scheduled in the clinic

## 2023-08-30 LAB — GP B STREP DNA SPEC QL NAA+PROBE: POSITIVE

## 2023-09-01 ENCOUNTER — HOSPITAL ENCOUNTER (INPATIENT)
Facility: CLINIC | Age: 33
LOS: 6 days | Discharge: HOME | DRG: 817 | End: 2023-09-07
Attending: OBSTETRICS & GYNECOLOGY | Admitting: OBSTETRICS & GYNECOLOGY
Payer: COMMERCIAL

## 2023-09-01 PROBLEM — O60.00 PRETERM LABOR: Status: ACTIVE | Noted: 2023-09-01

## 2023-09-01 LAB
ABO/RH(D): NORMAL
ALBUMIN UR-MCNC: NEGATIVE MG/DL
ANTIBODY SCREEN: NEGATIVE
APPEARANCE UR: CLEAR
BILIRUB UR QL STRIP: NEGATIVE
COLOR UR AUTO: ABNORMAL
ERYTHROCYTE [DISTWIDTH] IN BLOOD BY AUTOMATED COUNT: 13.4 % (ref 10–15)
GLUCOSE UR STRIP-MCNC: NEGATIVE MG/DL
HCT VFR BLD AUTO: 36.7 % (ref 35–47)
HGB BLD-MCNC: 12.8 G/DL (ref 11.7–15.7)
HGB UR QL STRIP: NEGATIVE
KETONES UR STRIP-MCNC: NEGATIVE MG/DL
LEUKOCYTE ESTERASE UR QL STRIP: NEGATIVE
MCH RBC QN AUTO: 31.4 PG (ref 26.5–33)
MCHC RBC AUTO-ENTMCNC: 34.9 G/DL (ref 31.5–36.5)
MCV RBC AUTO: 90 FL (ref 78–100)
MUCOUS THREADS #/AREA URNS LPF: PRESENT /LPF
NITRATE UR QL: NEGATIVE
PH UR STRIP: 7 [PH] (ref 5–7)
PLATELET # BLD AUTO: 198 10E3/UL (ref 150–450)
RBC # BLD AUTO: 4.08 10E6/UL (ref 3.8–5.2)
RBC URINE: <1 /HPF
SP GR UR STRIP: 1.01 (ref 1–1.03)
SPECIMEN EXPIRATION DATE: NORMAL
SQUAMOUS EPITHELIAL: 1 /HPF
UROBILINOGEN UR STRIP-MCNC: NORMAL MG/DL
WBC # BLD AUTO: 9.5 10E3/UL (ref 4–11)
WBC URINE: 1 /HPF
YEAST #/AREA URNS HPF: ABNORMAL /HPF

## 2023-09-01 PROCEDURE — 250N000013 HC RX MED GY IP 250 OP 250 PS 637

## 2023-09-01 PROCEDURE — 59025 FETAL NON-STRESS TEST: CPT | Mod: 26 | Performed by: OBSTETRICS & GYNECOLOGY

## 2023-09-01 PROCEDURE — 250N000011 HC RX IP 250 OP 636

## 2023-09-01 PROCEDURE — 99223 1ST HOSP IP/OBS HIGH 75: CPT | Mod: 25 | Performed by: OBSTETRICS & GYNECOLOGY

## 2023-09-01 PROCEDURE — 96372 THER/PROPH/DIAG INJ SC/IM: CPT

## 2023-09-01 PROCEDURE — G0463 HOSPITAL OUTPT CLINIC VISIT: HCPCS | Mod: 25

## 2023-09-01 PROCEDURE — 76815 OB US LIMITED FETUS(S): CPT | Mod: 26 | Performed by: OBSTETRICS & GYNECOLOGY

## 2023-09-01 PROCEDURE — 86780 TREPONEMA PALLIDUM: CPT

## 2023-09-01 PROCEDURE — 99221 1ST HOSP IP/OBS SF/LOW 40: CPT | Performed by: PEDIATRICS

## 2023-09-01 PROCEDURE — 0UCC7ZZ EXTIRPATION OF MATTER FROM CERVIX, VIA NATURAL OR ARTIFICIAL OPENING: ICD-10-PCS | Performed by: OBSTETRICS & GYNECOLOGY

## 2023-09-01 PROCEDURE — 81001 URINALYSIS AUTO W/SCOPE: CPT | Performed by: OBSTETRICS & GYNECOLOGY

## 2023-09-01 PROCEDURE — 120N000002 HC R&B MED SURG/OB UMMC

## 2023-09-01 PROCEDURE — 85027 COMPLETE CBC AUTOMATED: CPT

## 2023-09-01 PROCEDURE — 250N000011 HC RX IP 250 OP 636: Mod: JZ

## 2023-09-01 PROCEDURE — 258N000003 HC RX IP 258 OP 636

## 2023-09-01 PROCEDURE — 250N000011 HC RX IP 250 OP 636: Mod: JZ | Performed by: OBSTETRICS & GYNECOLOGY

## 2023-09-01 PROCEDURE — 86850 RBC ANTIBODY SCREEN: CPT

## 2023-09-01 RX ORDER — MAGNESIUM SULFATE IN WATER 40 MG/ML
2 INJECTION, SOLUTION INTRAVENOUS CONTINUOUS
Status: DISCONTINUED | OUTPATIENT
Start: 2023-09-01 | End: 2023-09-01 | Stop reason: HOSPADM

## 2023-09-01 RX ORDER — MAGNESIUM SULFATE HEPTAHYDRATE 40 MG/ML
2 INJECTION, SOLUTION INTRAVENOUS ONCE
Status: DISCONTINUED | OUTPATIENT
Start: 2023-09-01 | End: 2023-09-01

## 2023-09-01 RX ORDER — OXYTOCIN 10 [USP'U]/ML
10 INJECTION, SOLUTION INTRAMUSCULAR; INTRAVENOUS
Status: DISCONTINUED | OUTPATIENT
Start: 2023-09-01 | End: 2023-09-05

## 2023-09-01 RX ORDER — INDOMETHACIN 25 MG/1
25 CAPSULE ORAL EVERY 6 HOURS
Status: DISCONTINUED | OUTPATIENT
Start: 2023-09-02 | End: 2023-09-01 | Stop reason: HOSPADM

## 2023-09-01 RX ORDER — NALOXONE HYDROCHLORIDE 0.4 MG/ML
0.4 INJECTION, SOLUTION INTRAMUSCULAR; INTRAVENOUS; SUBCUTANEOUS
Status: DISCONTINUED | OUTPATIENT
Start: 2023-09-01 | End: 2023-09-05

## 2023-09-01 RX ORDER — IBUPROFEN 800 MG/1
800 TABLET, FILM COATED ORAL
Status: DISCONTINUED | OUTPATIENT
Start: 2023-09-01 | End: 2023-09-05

## 2023-09-01 RX ORDER — MAGNESIUM SULFATE HEPTAHYDRATE 40 MG/ML
4 INJECTION, SOLUTION INTRAVENOUS ONCE
Status: COMPLETED | OUTPATIENT
Start: 2023-09-01 | End: 2023-09-01

## 2023-09-01 RX ORDER — PENICILLIN G 3000000 [IU]/50ML
3 INJECTION, SOLUTION INTRAVENOUS EVERY 4 HOURS
Status: DISCONTINUED | OUTPATIENT
Start: 2023-09-01 | End: 2023-09-03

## 2023-09-01 RX ORDER — OXYTOCIN/0.9 % SODIUM CHLORIDE 30/500 ML
340 PLASTIC BAG, INJECTION (ML) INTRAVENOUS CONTINUOUS PRN
Status: DISCONTINUED | OUTPATIENT
Start: 2023-09-01 | End: 2023-09-05

## 2023-09-01 RX ORDER — SODIUM CHLORIDE, SODIUM LACTATE, POTASSIUM CHLORIDE, CALCIUM CHLORIDE 600; 310; 30; 20 MG/100ML; MG/100ML; MG/100ML; MG/100ML
INJECTION, SOLUTION INTRAVENOUS
Status: COMPLETED
Start: 2023-09-01 | End: 2023-09-01

## 2023-09-01 RX ORDER — OXYTOCIN/0.9 % SODIUM CHLORIDE 30/500 ML
100-340 PLASTIC BAG, INJECTION (ML) INTRAVENOUS CONTINUOUS PRN
Status: DISCONTINUED | OUTPATIENT
Start: 2023-09-01 | End: 2023-09-05

## 2023-09-01 RX ORDER — MISOPROSTOL 200 UG/1
400 TABLET ORAL
Status: DISCONTINUED | OUTPATIENT
Start: 2023-09-01 | End: 2023-09-05

## 2023-09-01 RX ORDER — PROCHLORPERAZINE MALEATE 5 MG
10 TABLET ORAL EVERY 6 HOURS PRN
Status: DISCONTINUED | OUTPATIENT
Start: 2023-09-01 | End: 2023-09-05

## 2023-09-01 RX ORDER — INDOMETHACIN 50 MG/1
50 CAPSULE ORAL ONCE
Status: COMPLETED | OUTPATIENT
Start: 2023-09-01 | End: 2023-09-01

## 2023-09-01 RX ORDER — KETOROLAC TROMETHAMINE 30 MG/ML
30 INJECTION, SOLUTION INTRAMUSCULAR; INTRAVENOUS
Status: DISCONTINUED | OUTPATIENT
Start: 2023-09-01 | End: 2023-09-05

## 2023-09-01 RX ORDER — MAGNESIUM SULFATE IN WATER 40 MG/ML
2 INJECTION, SOLUTION INTRAVENOUS CONTINUOUS
Status: DISCONTINUED | OUTPATIENT
Start: 2023-09-01 | End: 2023-09-07 | Stop reason: HOSPADM

## 2023-09-01 RX ORDER — NALOXONE HYDROCHLORIDE 0.4 MG/ML
0.2 INJECTION, SOLUTION INTRAMUSCULAR; INTRAVENOUS; SUBCUTANEOUS
Status: DISCONTINUED | OUTPATIENT
Start: 2023-09-01 | End: 2023-09-05

## 2023-09-01 RX ORDER — CALCIUM GLUCONATE 94 MG/ML
1 INJECTION, SOLUTION INTRAVENOUS
Status: DISCONTINUED | OUTPATIENT
Start: 2023-09-01 | End: 2023-09-01 | Stop reason: HOSPADM

## 2023-09-01 RX ORDER — PENICILLIN G POTASSIUM 5000000 [IU]/1
5 INJECTION, POWDER, FOR SOLUTION INTRAMUSCULAR; INTRAVENOUS ONCE
Status: COMPLETED | OUTPATIENT
Start: 2023-09-01 | End: 2023-09-01

## 2023-09-01 RX ORDER — METOCLOPRAMIDE 10 MG/1
10 TABLET ORAL EVERY 6 HOURS PRN
Status: DISCONTINUED | OUTPATIENT
Start: 2023-09-01 | End: 2023-09-05

## 2023-09-01 RX ORDER — PROCHLORPERAZINE 25 MG
25 SUPPOSITORY, RECTAL RECTAL EVERY 12 HOURS PRN
Status: DISCONTINUED | OUTPATIENT
Start: 2023-09-01 | End: 2023-09-05

## 2023-09-01 RX ORDER — INDOMETHACIN 25 MG/1
25 CAPSULE ORAL EVERY 6 HOURS
Status: DISCONTINUED | OUTPATIENT
Start: 2023-09-02 | End: 2023-09-01

## 2023-09-01 RX ORDER — PENICILLIN G POTASSIUM 5000000 [IU]/1
5 INJECTION, POWDER, FOR SOLUTION INTRAMUSCULAR; INTRAVENOUS ONCE
Status: DISCONTINUED | OUTPATIENT
Start: 2023-09-01 | End: 2023-09-01

## 2023-09-01 RX ORDER — ACETAMINOPHEN 325 MG/1
650 TABLET ORAL EVERY 4 HOURS PRN
Status: DISCONTINUED | OUTPATIENT
Start: 2023-09-01 | End: 2023-09-07 | Stop reason: HOSPADM

## 2023-09-01 RX ORDER — PENICILLIN G 3000000 [IU]/50ML
3 INJECTION, SOLUTION INTRAVENOUS EVERY 4 HOURS
Status: DISCONTINUED | OUTPATIENT
Start: 2023-09-01 | End: 2023-09-01 | Stop reason: HOSPADM

## 2023-09-01 RX ORDER — SODIUM CHLORIDE, SODIUM LACTATE, POTASSIUM CHLORIDE, CALCIUM CHLORIDE 600; 310; 30; 20 MG/100ML; MG/100ML; MG/100ML; MG/100ML
INJECTION, SOLUTION INTRAVENOUS CONTINUOUS
Status: DISCONTINUED | OUTPATIENT
Start: 2023-09-01 | End: 2023-09-07 | Stop reason: HOSPADM

## 2023-09-01 RX ORDER — CITRIC ACID/SODIUM CITRATE 334-500MG
30 SOLUTION, ORAL ORAL ONCE
Status: DISCONTINUED | OUTPATIENT
Start: 2023-09-01 | End: 2023-09-07 | Stop reason: HOSPADM

## 2023-09-01 RX ORDER — CALCIUM GLUCONATE 94 MG/ML
1 INJECTION, SOLUTION INTRAVENOUS
Status: DISCONTINUED | OUTPATIENT
Start: 2023-09-01 | End: 2023-09-07 | Stop reason: HOSPADM

## 2023-09-01 RX ORDER — ONDANSETRON 2 MG/ML
4 INJECTION INTRAMUSCULAR; INTRAVENOUS EVERY 6 HOURS PRN
Status: DISCONTINUED | OUTPATIENT
Start: 2023-09-01 | End: 2023-09-05

## 2023-09-01 RX ORDER — MISOPROSTOL 200 UG/1
800 TABLET ORAL
Status: DISCONTINUED | OUTPATIENT
Start: 2023-09-01 | End: 2023-09-05

## 2023-09-01 RX ORDER — TRANEXAMIC ACID 10 MG/ML
1 INJECTION, SOLUTION INTRAVENOUS EVERY 30 MIN PRN
Status: DISCONTINUED | OUTPATIENT
Start: 2023-09-01 | End: 2023-09-05

## 2023-09-01 RX ORDER — PENICILLIN G 3000000 [IU]/50ML
3 INJECTION, SOLUTION INTRAVENOUS EVERY 4 HOURS
Status: DISCONTINUED | OUTPATIENT
Start: 2023-09-02 | End: 2023-09-01

## 2023-09-01 RX ORDER — INDOMETHACIN 25 MG/1
25 CAPSULE ORAL EVERY 6 HOURS
Status: DISPENSED | OUTPATIENT
Start: 2023-09-01 | End: 2023-09-02

## 2023-09-01 RX ORDER — BETAMETHASONE SODIUM PHOSPHATE AND BETAMETHASONE ACETATE 3; 3 MG/ML; MG/ML
12 INJECTION, SUSPENSION INTRA-ARTICULAR; INTRALESIONAL; INTRAMUSCULAR; SOFT TISSUE EVERY 24 HOURS
Status: DISCONTINUED | OUTPATIENT
Start: 2023-09-01 | End: 2023-09-01 | Stop reason: HOSPADM

## 2023-09-01 RX ORDER — INDOMETHACIN 50 MG/1
50 CAPSULE ORAL ONCE
Status: DISCONTINUED | OUTPATIENT
Start: 2023-09-01 | End: 2023-09-01

## 2023-09-01 RX ORDER — METHYLERGONOVINE MALEATE 0.2 MG/ML
200 INJECTION INTRAVENOUS
Status: DISCONTINUED | OUTPATIENT
Start: 2023-09-01 | End: 2023-09-05

## 2023-09-01 RX ORDER — METOCLOPRAMIDE HYDROCHLORIDE 5 MG/ML
10 INJECTION INTRAMUSCULAR; INTRAVENOUS EVERY 6 HOURS PRN
Status: DISCONTINUED | OUTPATIENT
Start: 2023-09-01 | End: 2023-09-05

## 2023-09-01 RX ORDER — ONDANSETRON 4 MG/1
4 TABLET, ORALLY DISINTEGRATING ORAL EVERY 6 HOURS PRN
Status: DISCONTINUED | OUTPATIENT
Start: 2023-09-01 | End: 2023-09-05

## 2023-09-01 RX ORDER — CITRIC ACID/SODIUM CITRATE 334-500MG
30 SOLUTION, ORAL ORAL
Status: DISCONTINUED | OUTPATIENT
Start: 2023-09-01 | End: 2023-09-05

## 2023-09-01 RX ORDER — CARBOPROST TROMETHAMINE 250 UG/ML
250 INJECTION, SOLUTION INTRAMUSCULAR
Status: DISCONTINUED | OUTPATIENT
Start: 2023-09-01 | End: 2023-09-05

## 2023-09-01 RX ADMIN — PENICILLIN G POTASSIUM 5 MILLION UNITS: 5000000 POWDER, FOR SOLUTION INTRAMUSCULAR; INTRAPLEURAL; INTRATHECAL; INTRAVENOUS at 18:42

## 2023-09-01 RX ADMIN — SODIUM CHLORIDE, POTASSIUM CHLORIDE, SODIUM LACTATE AND CALCIUM CHLORIDE 1000 ML: 600; 310; 30; 20 INJECTION, SOLUTION INTRAVENOUS at 17:59

## 2023-09-01 RX ADMIN — SODIUM CHLORIDE, POTASSIUM CHLORIDE, SODIUM LACTATE AND CALCIUM CHLORIDE 1000 ML: 600; 310; 30; 20 INJECTION, SOLUTION INTRAVENOUS at 14:23

## 2023-09-01 RX ADMIN — MAGNESIUM SULFATE HEPTAHYDRATE 1 G/HR: 40 INJECTION, SOLUTION INTRAVENOUS at 16:48

## 2023-09-01 RX ADMIN — INDOMETHACIN 50 MG: 50 CAPSULE ORAL at 18:45

## 2023-09-01 RX ADMIN — BETAMETHASONE ACETATE AND BETAMETHASONE SODIUM PHOSPHATE 12 MG: 3; 3 INJECTION, SUSPENSION INTRA-ARTICULAR; INTRALESIONAL; INTRAMUSCULAR; SOFT TISSUE at 15:54

## 2023-09-01 RX ADMIN — PENICILLIN G 3 MILLION UNITS: 3000000 INJECTION, SOLUTION INTRAVENOUS at 23:28

## 2023-09-01 RX ADMIN — MAGNESIUM SULFATE HEPTAHYDRATE 4 G: 40 INJECTION, SOLUTION INTRAVENOUS at 15:09

## 2023-09-01 ASSESSMENT — ACTIVITIES OF DAILY LIVING (ADL)
ADLS_ACUITY_SCORE: 18

## 2023-09-01 NOTE — PROVIDER NOTIFICATION
09/01/23 1645   Provider Notification   Provider Name/Title Dr. Forrest   Method of Notification In Department   Notification Reason Uterine Activity     D: Patient is starting to feel more contractions than previous. Patient used the bathroom and moved to 477. Will restart Magnesium at 1 gm/hr and Dr. Forrest to come and see patient. P: Continue to monitor .

## 2023-09-01 NOTE — H&P
"[facility]  OB History and Physical    Note late entry due to patient care needs.     CC:  Contractions     HPI:   Dina Pena is a 33 year old  at 23w1d by LMP c/w 7w0d US, here with complaints of contractions s/p cerclage placement 2023. Pregnancy complicated as below. No history of hypertension or asthma.      She presents having contractions lasting ~30 seconds, spaced 2-4 minutes apart.Denies LOF or VB. Good FM.     She denies F/C, HA, vision changes, loss of taste/smell, cough, sore throat, CP, SOB, RUQ pain.     Pregnancy is complicated by:  - Cervical Insufficency s/p Landin cerclage placement 2023  - L4-L5 Lumbar fusion, received Epidural in labor     Pregnancy Complications:  1.  Cervical insufficiency s/p Landin Cerclage 2023    Prenatal Labs:   Lab Results   Component Value Date    AS Negative 2023    HGB 12.8 2023       GBS Status:   No results found for: GBS      PE:  Vit: Patient Vitals for the past 4 hrs:   BP Temp Temp src SpO2 Height Weight   23 1558 108/60 -- -- -- -- --   23 1554 107/55 -- -- -- -- --   23 1535 97/57 -- -- 98 % -- --   23 1533 94/57 -- -- 98 % -- --   23 1531 96/57 -- -- 98 % -- --   23 1529 93/56 -- -- -- -- --   23 1527 (!) 89/53 -- -- 97 % -- --   23 1511 116/63 -- -- -- -- --   23 1413 -- -- -- -- 1.753 m (5' 9\") 72.6 kg (160 lb)   23 1356 115/70 97.8  F (36.6  C) Oral -- -- --      Gen: Appears uncomfortable with contractions  CV: Appears well perfused  Pulm: Easy respiratory effort  Abd: regular contractions, palpable   Cx:       Cervix appears ~2 cm long, ~ 1 cm dilated external os visually, stitch visible at 12 o'clock.  No bleeding noted.  Does not seem to be on excessive tension.       After initial exam, repeat exam done several hours later due to ongoing, painful contractions.  At this time, the decision was made to remove cerclage.  The " knot was grasped at 12 o'clock and one side of the suture adjacent to the knot was cut.  The entire suture was removed intact.  The cervix was 1/50% following removal of the suture.       Pres: breech by bedside US       Fetal heart tracing AGA     Assessment  Ms. Dina Pena is a 33 year old , at 23w1d by who presents with regular contractions with cerclage in place     Plan  - Labor   -Reviewed concern for  labor and risk of  delivery    -Admit to L&D for ongoing monitoring  -continuous fetal monitoring  -[facility]  -s/p mag bolus which was stopped due to patient feeling unwell, was able to restart mag at 1 g/hr and then increase to 2g/hr infusion for neuroprotection  -given ongoing contractions despite mag, will give indocin X48 hours for tocolysis   -PCN for GBS+ status   -NICU consult ordered   -Cerclage removed at bedside given ongoing contractions after several hours of monitoring.    -seen recently on  with negative G/C, neg wet prep.  + GBS   -Urine culture ordered.    -We reviewed that she remains at high risk of  delivery.  She met with NICU what to expect from cares in this early period.  We discussed that goal is to try to prolong pregnancy through the steroid window with tocolysis.  We did discuss that if cervical dilation progresses or concern for  status, would recommend proceeding with delivery.  If remains in breech presentation, would recommend delivery by classical .  Discussed risks of procedure including bleeding/infection/damage to surrounding structures and need for delivery by C section in all future pregnancies.        Denia Forrest MD PhD  Department of Obstetrics, Gynecology and Women's Health  Maternal Fetal Medicine Division    I spent a total of 75 minutes with the patient today, reviewing records, labs, counseling the patient, documenting care and discussing with other providers.

## 2023-09-01 NOTE — PROVIDER NOTIFICATION
09/01/23 1527   Provider Notification   Provider Name/Title Dr. Forrest   Method of Notification Electronic Page   Notification Reason Maternal Vital Sign Change     D: Magnesium 4gm load running and patient began feeling like everything was heavy and she was staring into space stating she felt very worried. BP was 89/53 and recheck was 95/54. Magnesium load was shut off and LR IV fluid bolus was started. Oxygen saturation were WNL. Dr. Forrest was called to room to assess. Dr. Forrest was in the main OR and was scrubbing out to come and see patient. When she arrived blood pressure had normalized and patient was feeling a lot better. Will keep Magnesium off at this time. Patient states she is not feeling the contractions since the load. P: Continue to monitor.

## 2023-09-01 NOTE — PLAN OF CARE
Pt to triage for evaluation of  labor. Reports abd and back pain started around 1030 this morning. Back pain is constant, abd pain intermittent. No LOF or bleeding. Denies fever or chills, N&V, HA. Reports taking flagyl for BV diagnosed earlier this week. Similar pain earlier this week, resolved after fluid bolus. FHR AGA, uterine irritability. VS WNL. Dr. Kern discussing plan with Dr. Forrest and will return to make plan with pt.

## 2023-09-02 LAB — T PALLIDUM AB SER QL: NONREACTIVE

## 2023-09-02 PROCEDURE — 250N000011 HC RX IP 250 OP 636: Performed by: OBSTETRICS & GYNECOLOGY

## 2023-09-02 PROCEDURE — 120N000002 HC R&B MED SURG/OB UMMC

## 2023-09-02 PROCEDURE — 250N000013 HC RX MED GY IP 250 OP 250 PS 637

## 2023-09-02 PROCEDURE — 76815 OB US LIMITED FETUS(S): CPT | Mod: 26 | Performed by: OBSTETRICS & GYNECOLOGY

## 2023-09-02 PROCEDURE — 99232 SBSQ HOSP IP/OBS MODERATE 35: CPT | Mod: 25 | Performed by: OBSTETRICS & GYNECOLOGY

## 2023-09-02 PROCEDURE — 258N000003 HC RX IP 258 OP 636: Performed by: OBSTETRICS & GYNECOLOGY

## 2023-09-02 PROCEDURE — 250N000011 HC RX IP 250 OP 636: Mod: JZ

## 2023-09-02 PROCEDURE — 250N000013 HC RX MED GY IP 250 OP 250 PS 637: Performed by: OBSTETRICS & GYNECOLOGY

## 2023-09-02 PROCEDURE — 59025 FETAL NON-STRESS TEST: CPT | Mod: 26 | Performed by: OBSTETRICS & GYNECOLOGY

## 2023-09-02 RX ORDER — PRENATAL VIT/IRON FUM/FOLIC AC 27MG-0.8MG
1 TABLET ORAL DAILY
Status: DISCONTINUED | OUTPATIENT
Start: 2023-09-02 | End: 2023-09-07 | Stop reason: HOSPADM

## 2023-09-02 RX ORDER — HYDROXYZINE HYDROCHLORIDE 25 MG/1
25 TABLET, FILM COATED ORAL EVERY 4 HOURS PRN
Status: DISCONTINUED | OUTPATIENT
Start: 2023-09-02 | End: 2023-09-07 | Stop reason: HOSPADM

## 2023-09-02 RX ORDER — AMOXICILLIN 250 MG
1 CAPSULE ORAL AT BEDTIME
Status: DISCONTINUED | OUTPATIENT
Start: 2023-09-02 | End: 2023-09-02

## 2023-09-02 RX ORDER — BETAMETHASONE SODIUM PHOSPHATE AND BETAMETHASONE ACETATE 3; 3 MG/ML; MG/ML
12 INJECTION, SUSPENSION INTRA-ARTICULAR; INTRALESIONAL; INTRAMUSCULAR; SOFT TISSUE ONCE
Status: COMPLETED | OUTPATIENT
Start: 2023-09-02 | End: 2023-09-02

## 2023-09-02 RX ORDER — AMOXICILLIN 250 MG
1 CAPSULE ORAL 2 TIMES DAILY
Status: DISCONTINUED | OUTPATIENT
Start: 2023-09-02 | End: 2023-09-07 | Stop reason: HOSPADM

## 2023-09-02 RX ORDER — ASPIRIN 81 MG/1
81 TABLET ORAL DAILY
Status: DISCONTINUED | OUTPATIENT
Start: 2023-09-02 | End: 2023-09-07 | Stop reason: HOSPADM

## 2023-09-02 RX ORDER — INDOMETHACIN 25 MG/1
25 CAPSULE ORAL
Status: COMPLETED | OUTPATIENT
Start: 2023-09-02 | End: 2023-09-03

## 2023-09-02 RX ORDER — METRONIDAZOLE 500 MG/1
500 TABLET ORAL 2 TIMES DAILY
Status: COMPLETED | OUTPATIENT
Start: 2023-09-02 | End: 2023-09-04

## 2023-09-02 RX ADMIN — INDOMETHACIN 25 MG: 25 CAPSULE ORAL at 19:36

## 2023-09-02 RX ADMIN — MAGNESIUM SULFATE HEPTAHYDRATE 2 G/HR: 40 INJECTION, SOLUTION INTRAVENOUS at 16:10

## 2023-09-02 RX ADMIN — PENICILLIN G 3 MILLION UNITS: 3000000 INJECTION, SOLUTION INTRAVENOUS at 19:36

## 2023-09-02 RX ADMIN — PENICILLIN G 3 MILLION UNITS: 3000000 INJECTION, SOLUTION INTRAVENOUS at 08:05

## 2023-09-02 RX ADMIN — SODIUM CHLORIDE, POTASSIUM CHLORIDE, SODIUM LACTATE AND CALCIUM CHLORIDE: 600; 310; 30; 20 INJECTION, SOLUTION INTRAVENOUS at 16:08

## 2023-09-02 RX ADMIN — PRENATAL VITAMINS-IRON FUMARATE 27 MG IRON-FOLIC ACID 0.8 MG TABLET 1 TABLET: at 12:21

## 2023-09-02 RX ADMIN — PENICILLIN G 3 MILLION UNITS: 3000000 INJECTION, SOLUTION INTRAVENOUS at 12:20

## 2023-09-02 RX ADMIN — METRONIDAZOLE 500 MG: 500 TABLET ORAL at 12:57

## 2023-09-02 RX ADMIN — MAGNESIUM SULFATE HEPTAHYDRATE 2 G/HR: 40 INJECTION, SOLUTION INTRAVENOUS at 09:17

## 2023-09-02 RX ADMIN — ASPIRIN 81 MG: 81 TABLET, COATED ORAL at 12:21

## 2023-09-02 RX ADMIN — SENNOSIDES AND DOCUSATE SODIUM 1 TABLET: 50; 8.6 TABLET ORAL at 19:36

## 2023-09-02 RX ADMIN — PENICILLIN G 3 MILLION UNITS: 3000000 INJECTION, SOLUTION INTRAVENOUS at 16:07

## 2023-09-02 RX ADMIN — BETAMETHASONE ACETATE AND BETAMETHASONE SODIUM PHOSPHATE 12 MG: 3; 3 INJECTION, SUSPENSION INTRA-ARTICULAR; INTRALESIONAL; INTRAMUSCULAR; SOFT TISSUE at 16:05

## 2023-09-02 RX ADMIN — PENICILLIN G 3 MILLION UNITS: 3000000 INJECTION, SOLUTION INTRAVENOUS at 03:32

## 2023-09-02 RX ADMIN — INDOMETHACIN 25 MG: 25 CAPSULE ORAL at 07:08

## 2023-09-02 RX ADMIN — SENNOSIDES AND DOCUSATE SODIUM 1 TABLET: 50; 8.6 TABLET ORAL at 12:56

## 2023-09-02 RX ADMIN — METRONIDAZOLE 500 MG: 500 TABLET ORAL at 20:47

## 2023-09-02 RX ADMIN — INDOMETHACIN 25 MG: 25 CAPSULE ORAL at 00:52

## 2023-09-02 RX ADMIN — INDOMETHACIN 25 MG: 25 CAPSULE ORAL at 12:56

## 2023-09-02 RX ADMIN — SODIUM CHLORIDE, POTASSIUM CHLORIDE, SODIUM LACTATE AND CALCIUM CHLORIDE: 600; 310; 30; 20 INJECTION, SOLUTION INTRAVENOUS at 05:10

## 2023-09-02 ASSESSMENT — ACTIVITIES OF DAILY LIVING (ADL)
ADLS_ACUITY_SCORE: 18

## 2023-09-02 NOTE — PROVIDER NOTIFICATION
09/02/23 0930   Provider Notification   Provider Name/Title Dr. Forrest   Method of Notification At Bedside   Notification Reason Other     D: Fetal heart rate tracing reviewed with family and Dr. Forrest. Patient continues to have irritability on the monitor but is not feeling contractions. Will restart Magnesium at 2gm/hr pump double checked with KLAUS Quijano RN and re evaluate after the betamethasone window. P: Continue to monitor.

## 2023-09-02 NOTE — PROGRESS NOTES
MFM Antepartum Progress Note      Subjective: Contractions have improved overnight.  No LOF, no VB.      Happy to be pregnant another day       Objective:  Vitals:    23 0800 23 0900 23 1000 23 1230   BP:    102/58   BP Location:    Left arm   Patient Position:    Semi-Stearns's   Cuff Size:    Adult Regular   Resp:    16   Temp:    97.9  F (36.6  C)   TempSrc:    Oral   SpO2: 97% 97% 96% 96%   Weight:       Height:           I/O last 3 completed shifts:  In: -   Out: 1100 [Urine:1100]    Gen: Resting comfortably in bed, NAD  CV: appears well perfused  Resp: easy respiratory effort   Abd: Gravid, non-tender, non-distended  Ext: non-tender, no edema    FHT: , mo variability, +10x10 accels, occasional variable appearing decels  [place]: irritable    Bedside US:  Vertex today (Breech Yesterday)     Assessment:   Ms. Dina Pena is a 33 year old , at 23w2d by who presented with regular contractions with cerclage in place.  Cerclage was removed given ongoing contractions on 2023.   Her cervix remained stable overnight and she notes contractions have improved     Plan  - Labor   -reviewed risk of  delivery, though discussed that we are happy symptoms have improved  -s/p BMTZ#1 on 2023 at 1500, #2 today  -Given significant contractions, will plan to continue mag, indocin for 48 hours.                -PCN for GBS+ status               -s/p NICU consult               -s/p removal of Landin Cerclage on 2023               -seen recently on  with negative G/C, neg wet prep.    -was diagnosed with BV locally started 7 day course of flagyl on , will continue through 2023               -UA without LE or nitrites               -We reviewed that she remains at high risk of  delivery.     -Will check presentation if labors.  She notes she would be interested in vaginal delivery if she labors in vertex presentation.      -Signed consent and reviewed  risks if classical C section indicated for presentation or fetal distress.    -Plan for repeat US to assess fetal growth on 9/3/2023 if still pregnant    Dispo: given risk of  delivery at this early gestational age, recommend ongoing inpatient management     Denia Forrest MD PhD  Department of Obstetrics, Gynecology and Women's Health  Maternal Fetal Medicine Division      I spent a total of 45 minutes on the date of this encounter including preparing to see the patient (reviewing medical records/tests), counseling and discussing the plan of care, documenting the visit in the electronic medical record, and communicating with other health care professionals and/or care coordination.

## 2023-09-02 NOTE — PROVIDER NOTIFICATION
"   09/01/23 2029   Provider Notification   Provider Name/Title Dr. Cole   Method of Notification Electronic Page   Request Evaluate in Person   Notification Reason Patient Request;Status Update     Patient is reporting feeling her contractions with more intensity and feeling \"waves of heat\" with each contraction. Offered cold washcloths, patient has fan on at the bedside  "

## 2023-09-02 NOTE — PROVIDER NOTIFICATION
09/01/23 1800   Provider Notification   Provider Name/Title Dr. Forrest and Dr. Kern   Method of Notification At Bedside   Notification Reason SVE  (cerclage removed)     D: Cercalge removed at the bedside, C/S consent signed, will start Indocin and PCN per orders. SVE 1cm per Dr. Kern. Patient very teary eyed. NICU NNP Keena will be coming to do a consult. P: Continue to monitor.

## 2023-09-02 NOTE — CONSULTS
"     [facility]                      Neonatology Counseling Consult    I was asked to provide antepartum counseling for Dina Pena at the request of Denia Forrest MD secondary to  labor. Ms. Pena is currently 23 weeks and 1 day PMA and has a hx significant for cervical dilation requiring cerclage. With development of  labor today, the cerclage was removed, betamethasone (once on  at 16:00) was given, magnesium and indomethacin were started. The do not know the sex of their baby, they have started to consider names. They have a toddler, Negin, at their home in [place]. They identify as their family and friends as good supports in their lives. They identify their worries as being \"will our baby survive?\" and if the baby survives what their quality of life will be.    Ms. Pena, accompanied by her partner Cleveland were counseled on the expected hospital course, potential risks, and outcomes associated with an infant born at approximately 23 weeks gestation. The counseling included: morbidity, mortality, initial delivery room stabilization, respiratory course, lung development, RDS, retinopathy of prematurity, hemodynamic support, infection, nutrition, growth and neurodevelopment, and expected hospital length of stay following birth at 23 weeks.      They are clear in their goals for the NICU to support their baby's medical needs and the concern about quality of life and will want to discuss this more over time if/when complications arise.     Please feel free to call with any additional questions or concerns.      Shara Polk MD  Attending Neonatologist   Intensive Care Unit  [facility]    Floor Time (min): 10  Face to Face Time (min): 25  Total Time (minutes): 35  More than 50% of my time was spent in direct, face to face, antepartum counseling with the above patient.  "

## 2023-09-02 NOTE — PLAN OF CARE
Assumed care at 1915. Patient meeting with NICU provider, tearful regarding situation. See flowsheets for FHR and uterine activity. Patient denies change of vision, headache, RUQ pain, leakage of fluid or bleeding. Reports no adverse symptoms from magnesium infusion. Patient reporting decrease in frequency and intensity of contractions. Partner Cleveland at bedside providing support. Report given BRANDI Wheeler.

## 2023-09-03 ENCOUNTER — APPOINTMENT (OUTPATIENT)
Dept: ULTRASOUND IMAGING | Facility: CLINIC | Age: 33
DRG: 817 | End: 2023-09-03
Attending: OBSTETRICS & GYNECOLOGY
Payer: COMMERCIAL

## 2023-09-03 LAB — MAGNESIUM SERPL-MCNC: 3.5 MG/DL (ref 1.7–2.3)

## 2023-09-03 PROCEDURE — 250N000013 HC RX MED GY IP 250 OP 250 PS 637: Performed by: OBSTETRICS & GYNECOLOGY

## 2023-09-03 PROCEDURE — 36415 COLL VENOUS BLD VENIPUNCTURE: CPT

## 2023-09-03 PROCEDURE — 120N000002 HC R&B MED SURG/OB UMMC

## 2023-09-03 PROCEDURE — 76811 OB US DETAILED SNGL FETUS: CPT

## 2023-09-03 PROCEDURE — 250N000011 HC RX IP 250 OP 636: Mod: JZ

## 2023-09-03 PROCEDURE — 258N000003 HC RX IP 258 OP 636: Performed by: OBSTETRICS & GYNECOLOGY

## 2023-09-03 PROCEDURE — 86762 RUBELLA ANTIBODY: CPT

## 2023-09-03 PROCEDURE — 250N000013 HC RX MED GY IP 250 OP 250 PS 637

## 2023-09-03 PROCEDURE — 83735 ASSAY OF MAGNESIUM: CPT

## 2023-09-03 PROCEDURE — 87389 HIV-1 AG W/HIV-1&-2 AB AG IA: CPT

## 2023-09-03 PROCEDURE — 76811 OB US DETAILED SNGL FETUS: CPT | Mod: 26 | Performed by: OBSTETRICS & GYNECOLOGY

## 2023-09-03 PROCEDURE — 99232 SBSQ HOSP IP/OBS MODERATE 35: CPT | Mod: 25 | Performed by: OBSTETRICS & GYNECOLOGY

## 2023-09-03 PROCEDURE — 87340 HEPATITIS B SURFACE AG IA: CPT

## 2023-09-03 PROCEDURE — 59025 FETAL NON-STRESS TEST: CPT | Mod: 26 | Performed by: OBSTETRICS & GYNECOLOGY

## 2023-09-03 RX ADMIN — PRENATAL VITAMINS-IRON FUMARATE 27 MG IRON-FOLIC ACID 0.8 MG TABLET 1 TABLET: at 09:11

## 2023-09-03 RX ADMIN — PENICILLIN G 3 MILLION UNITS: 3000000 INJECTION, SOLUTION INTRAVENOUS at 03:53

## 2023-09-03 RX ADMIN — PENICILLIN G 3 MILLION UNITS: 3000000 INJECTION, SOLUTION INTRAVENOUS at 08:01

## 2023-09-03 RX ADMIN — SENNOSIDES AND DOCUSATE SODIUM 1 TABLET: 50; 8.6 TABLET ORAL at 08:11

## 2023-09-03 RX ADMIN — INDOMETHACIN 25 MG: 25 CAPSULE ORAL at 07:00

## 2023-09-03 RX ADMIN — SENNOSIDES AND DOCUSATE SODIUM 1 TABLET: 50; 8.6 TABLET ORAL at 20:54

## 2023-09-03 RX ADMIN — METRONIDAZOLE 500 MG: 500 TABLET ORAL at 20:54

## 2023-09-03 RX ADMIN — PENICILLIN G 3 MILLION UNITS: 3000000 INJECTION, SOLUTION INTRAVENOUS at 00:01

## 2023-09-03 RX ADMIN — PENICILLIN G 3 MILLION UNITS: 3000000 INJECTION, SOLUTION INTRAVENOUS at 12:15

## 2023-09-03 RX ADMIN — INDOMETHACIN 25 MG: 25 CAPSULE ORAL at 00:57

## 2023-09-03 RX ADMIN — INDOMETHACIN 25 MG: 25 CAPSULE ORAL at 13:04

## 2023-09-03 RX ADMIN — ASPIRIN 81 MG: 81 TABLET, COATED ORAL at 09:12

## 2023-09-03 RX ADMIN — HYDROXYZINE HYDROCHLORIDE 25 MG: 25 TABLET, FILM COATED ORAL at 03:55

## 2023-09-03 RX ADMIN — METRONIDAZOLE 500 MG: 500 TABLET ORAL at 09:11

## 2023-09-03 RX ADMIN — SODIUM CHLORIDE, POTASSIUM CHLORIDE, SODIUM LACTATE AND CALCIUM CHLORIDE: 600; 310; 30; 20 INJECTION, SOLUTION INTRAVENOUS at 03:52

## 2023-09-03 RX ADMIN — MAGNESIUM SULFATE HEPTAHYDRATE 2 G/HR: 40 INJECTION, SOLUTION INTRAVENOUS at 11:22

## 2023-09-03 ASSESSMENT — ACTIVITIES OF DAILY LIVING (ADL)
ADLS_ACUITY_SCORE: 18

## 2023-09-03 NOTE — PLAN OF CARE
Problem: Plan of Care - These are the overarching goals to be used throughout the patient stay.    Goal: Plan of Care Review  VSS, afebrile. No s/s of infection. EFM normal baseline. Providers aware of periodic decelerations. See flowsheets. Mostly minimal variability. Magnesium is likely a contributing factor.   Outcome: Progressing  Flowsheets (Taken 9/3/2023 1448)  Plan of Care Reviewed With:   patient   spouse  Overall Patient Progress: no change  Goal: Absence of Hospital-Acquired Illness or Injury  Outcome: Progressing  Intervention: Prevent Skin Injury  Recent Flowsheet Documentation  Taken 9/3/2023 0900 by Summer Nicole RN  Body Position: position changed independently  Intervention: Prevent and Manage VTE (Venous Thromboembolism) Risk  Recent Flowsheet Documentation  Taken 9/3/2023 0900 by Summer Nicole RN  VTE Prevention/Management: SCDs (sequential compression devices) on  Goal: Optimal Comfort and Wellbeing  Outcome: Progressing  Intervention: Provide Person-Centered Care  Recent Flowsheet Documentation  Taken 9/3/2023 0900 by Summer Nicole RN  Trust Relationship/Rapport:   care explained   choices provided  Goal: Readiness for Transition of Care  Outcome: Progressing     Problem: Labor  Goal: Hemostasis  Outcome: Progressing  Goal: Stable Fetal Wellbeing  Outcome: Progressing  Intervention: Promote and Monitor Fetal Wellbeing  Recent Flowsheet Documentation  Taken 9/3/2023 0900 by Summer Nicole RN  Body Position: position changed independently  Goal: Effective Progression to Delivery  Outcome: Progressing  Goal: Absence of Infection Signs and Symptoms  Outcome: Progressing  Goal: Acceptable Pain Control  Outcome: Progressing  Goal: Normal Uterine Contraction Pattern  Outcome: Progressing   Goal Outcome Evaluation:      Plan of Care Reviewed With: patient, spouse    Overall Patient Progress: no changeOverall Patient Progress: no change

## 2023-09-03 NOTE — PLAN OF CARE
Data: Maternal status vital signs stable. Afebrile. Signs and symptoms of infection not present. Denies any leaking of fluids, vaginal bleeding, and or painful contractions. Patient denies any visual changes, headaches, or epigastric pain.   Action/interventions: Encourage voiding, hydration, and repositioning.   Response: Patient rested well through the night.   Plan: Continue expectant management. Observe for and notify care provider of indicators of progressing labor, signs/symptoms of infection, fetal/maternal compromise. Continue with plan of care. Report given to Keena PAZ.

## 2023-09-03 NOTE — PROVIDER NOTIFICATION
09/02/23 2145   Provider Notification   Provider Name/Title Dr. Kern   Method of Notification Phone   Request Evaluate - Remote   Notification Reason Other (Comment)     Discussed monitoring overnight. Plan to move to TID monitoring overnight, with clear guidelines for the patient to inform nurse of any increase in contractions, pain or discomfort. If increase in contractions, will place back on monitor.

## 2023-09-03 NOTE — PROVIDER NOTIFICATION
"   09/03/23 0207   Provider Notification   Provider Name/Title Dr. Kern   Method of Notification Electronic Page   Request Evaluate in Person   Notification Reason Uterine Activity;Status Update     Patient reported feeling \"off\" since 0108 when she went up to the bathroom. Patient felt that there was scant clear discharge with pink flecks. No continued leakage of fluid and no bleeding noted. Reported feeling more contractions. Patient placed on the monitor again. Requested provider assessment.   "

## 2023-09-03 NOTE — PLAN OF CARE
Data: Maternal status vital signs stable. Afebrile. Signs and symptoms of infection not present. Denies any leaking of fluids, vaginal bleeding, and or painful contractions. Patient denies any visual changes, headaches, or epigastric pain. See flow sheets for details on fetal heart rate tracings and uterine activity. Continues on magnesium infusion without adverse symptoms. See notes for interactions overnight.   Action/interventions: Encourage voiding, hydration, and repositioning.   Response: Patient rested well through the night.   Plan: Continue expectant management. Observe for and notify care provider of indicators of progressing labor, signs/symptoms of infection, fetal/maternal compromise. Continue with plan of care. Report given to BRANDI Wheeler

## 2023-09-03 NOTE — PROVIDER NOTIFICATION
09/03/23 0858   Fetal Assessment   Fetal Movement active   Fetal HR Assessment Method external US   Fetal HR (beats/min) 130   Fetal HR Baseline normal range     Reapplied EFM after ultrasound

## 2023-09-03 NOTE — PROGRESS NOTES
Progress Note:    Presented to patient room due to concern for uterine contractions. Noticed feeling off since 0100. She went to the bathroom and noted some discharge. FHT: 130 moderate, appropriate for gestational age. SVE: 1cm dilated. Cephalic by BSUS. Discussed that cervix has not changed and provided reassurance. Will plan to continue magnesium and tocolysis until total 48 hours. Patient planning for hydroxyzine to help with sleep.    Heri Kern MD, MPH  Ob/Gyn Resident, PGY-3

## 2023-09-03 NOTE — PROGRESS NOTES
MFM Antepartum Progress Note      Subjective:   This morning feels like contractions have mostly improved.  + FM. No LOF.  No VB.     Enjoying visit from daughter Negin and family today.         Objective:  Vitals:    23 0800 23 0900 23 1000 23 1108   BP: 98/55   108/56   BP Location:       Patient Position:       Cuff Size:       Resp:    16   Temp:    98.3  F (36.8  C)   TempSrc:    Oral   SpO2: 100% 100% 100%    Weight:       Height:           I/O last 3 completed shifts:  In: -   Out: 3550 [Urine:3550]    Gen: Resting comfortably in bed, NAD  CV: appears well perfused  Resp: easy respiratory effort   Abd: Gravid, non-tender, non-distended  Ext: non-tender, no edema    FHT: , mo variability, +10x10 accels, occasional variable appearing decels  Burdett: irritable    Bedside US:  Vertex today (Breech Yesterday)     Assessment:   Ms. Dina Pena is a 33 year old , at 23w3d who presented with regular contractions with cerclage in place.  Cerclage was removed given ongoing contractions on 2023.   Her cervix remained stable since and contractions have improved     Plan  - Labor   -previously reviewed risk of  delivery, though discussed that we are happy symptoms have improved  -s/p BMTZ#1 and #2 on -2023  -Given significant contractions on admission, will plan to continue mag, indocin for 48 hours. Stop 9/3/2023 at 3PM                -PCN for GBS+ status stop at 3PM                -s/p NICU consult               -s/p removal of Landin Cerclage on 2023               -seen recently on  with negative G/C, neg wet prep.    -was diagnosed with BV locally started 7 day course of flagyl on , will continue through 2023               -UA without LE or nitrites      -Will check presentation if labors.  She notes she would be interested in vaginal delivery if she labors in vertex presentation.      -Signed consent and reviewed risks if classical C  section indicated for presentation or fetal distress.    -Growth US today shows normal EFW    #UTDA1, bilateral  -seen on US on 9/3/2023  -has had LR CFFDNA  -will continue to follow (at 28 weeks if still pregnant) and will let NICU know if delivers prior to then.     Dispo: given risk of  delivery at this early gestational age, recommend ongoing inpatient management     Denia Forrest MD PhD  Department of Obstetrics, Gynecology and Women's Health  Maternal Fetal Medicine Division      I spent a total of 45 minutes on the date of this encounter including preparing to see the patient (reviewing medical records/tests), counseling and discussing the plan of care, documenting the visit in the electronic medical record, and communicating with other health care professionals and/or care coordination.

## 2023-09-03 NOTE — PROVIDER NOTIFICATION
"   09/03/23 1240   Provider Notification   Provider Name/Title Dr. Forrest, Dr. Cole, Dr. Briseno   Method of Notification At Bedside   Request Evaluate in Person   Notification Reason Status Update     Providers here for daily rounds.  EFM strip review. Providers aware of Category 2 tracing - minimal variability with episodic and intermittent decelerations. Normal baseline. Baby is actively moving. Questions from patient today are mostly regarding \"what's next\" Providers answered questions. Family here visiting today. Expectant management.   "

## 2023-09-04 LAB
RUBV IGG SERPL QL IA: 2.13 INDEX
RUBV IGG SERPL QL IA: POSITIVE

## 2023-09-04 PROCEDURE — 99232 SBSQ HOSP IP/OBS MODERATE 35: CPT | Mod: 25 | Performed by: OBSTETRICS & GYNECOLOGY

## 2023-09-04 PROCEDURE — 250N000013 HC RX MED GY IP 250 OP 250 PS 637

## 2023-09-04 PROCEDURE — 250N000011 HC RX IP 250 OP 636: Mod: JZ | Performed by: STUDENT IN AN ORGANIZED HEALTH CARE EDUCATION/TRAINING PROGRAM

## 2023-09-04 PROCEDURE — 120N000002 HC R&B MED SURG/OB UMMC

## 2023-09-04 PROCEDURE — 250N000013 HC RX MED GY IP 250 OP 250 PS 637: Performed by: STUDENT IN AN ORGANIZED HEALTH CARE EDUCATION/TRAINING PROGRAM

## 2023-09-04 PROCEDURE — 250N000013 HC RX MED GY IP 250 OP 250 PS 637: Performed by: OBSTETRICS & GYNECOLOGY

## 2023-09-04 PROCEDURE — 59025 FETAL NON-STRESS TEST: CPT | Mod: 26 | Performed by: OBSTETRICS & GYNECOLOGY

## 2023-09-04 RX ORDER — ENOXAPARIN SODIUM 100 MG/ML
40 INJECTION SUBCUTANEOUS EVERY 24 HOURS
Status: DISCONTINUED | OUTPATIENT
Start: 2023-09-04 | End: 2023-09-07 | Stop reason: HOSPADM

## 2023-09-04 RX ORDER — PROGESTERONE 200 MG/1
200 CAPSULE ORAL DAILY
Status: DISCONTINUED | OUTPATIENT
Start: 2023-09-04 | End: 2023-09-07 | Stop reason: HOSPADM

## 2023-09-04 RX ADMIN — SENNOSIDES AND DOCUSATE SODIUM 1 TABLET: 50; 8.6 TABLET ORAL at 21:28

## 2023-09-04 RX ADMIN — ASPIRIN 81 MG: 81 TABLET, COATED ORAL at 09:29

## 2023-09-04 RX ADMIN — ENOXAPARIN SODIUM 40 MG: 40 INJECTION SUBCUTANEOUS at 21:28

## 2023-09-04 RX ADMIN — SENNOSIDES AND DOCUSATE SODIUM 1 TABLET: 50; 8.6 TABLET ORAL at 07:43

## 2023-09-04 RX ADMIN — METRONIDAZOLE 500 MG: 500 TABLET ORAL at 21:28

## 2023-09-04 RX ADMIN — PRENATAL VITAMINS-IRON FUMARATE 27 MG IRON-FOLIC ACID 0.8 MG TABLET 1 TABLET: at 09:28

## 2023-09-04 RX ADMIN — METRONIDAZOLE 500 MG: 500 TABLET ORAL at 09:28

## 2023-09-04 RX ADMIN — PROGESTERONE 200 MG: 200 CAPSULE ORAL at 15:35

## 2023-09-04 ASSESSMENT — ACTIVITIES OF DAILY LIVING (ADL)
ADLS_ACUITY_SCORE: 18

## 2023-09-04 NOTE — PLAN OF CARE
VSS. Afebrile. Denies bleeding, cramping, LOF. Patient comfortable throughout the shift. BID EFM as charted, Dr. Bourgeois notified of intermittent variable decels and reviewed strip at 2145, G3 pager notified in morning about continued variable decels. PIV saline locked. Continue to monitor.

## 2023-09-04 NOTE — PROGRESS NOTES
[facility] Antepartum Progress Note      Subjective:   Patient states she is experiencing intermittent episodes of shortness of breath, mainly at night. She states that a few deep, controlled breaths will resolve these episodes, but they are new since her admission. She has no acute concerns this morning.  +FM. No LOF. No VB. Denies contractions.      Objective:  Vitals:    23 0604 23 0740   BP: (!) 89/52 (!) 88/51 97/50    BP Location: Right arm  Right arm    Patient Position: Semi-Stearns's  Semi-Stearns's    Cuff Size: Adult Regular  Adult Regular    Pulse:   54    Resp: 16  16 16   Temp: 98.1  F (36.7  C)  98.3  F (36.8  C)    TempSrc: Oral  Oral    SpO2:       Weight:       Height:           I/O last 3 completed shifts:  In: 4909.67 [I.V.:4909.67]  Out: 2200 [Urine:2200]    Gen: Resting comfortably in bed, NAD  CV: appears well perfused  Resp: easy respiratory effort   Abd: Gravid, non-tender, non-distended  Ext: non-tender, no edema    FHT: , mo variability, +10x10 accels, occasional variable appearing decels  Jenkins: irritable    US Imagin/3/2023  IMPRESSION  ---------------------------------------------------------------------------------------------------------  1) Padgett intrauterine pregnancy at 23w 3d gestation.  2) There is bilateral fetal urinary tract dilation (UTD-A1) with the right renal pelvis measuring 5.66 mm and the left renal pelvis measuring 5.96 mm. There is no calyceal  dilation and the renal parenchyma is normal in appearance. No evidence of hydroureter and normal bladder.  3. No other fetal anomalies commonly detected by ultrasound or soft markers of aneuploidy were identified in the detailed fetal anatomic survey within the limits of prenatal  ultrasound.  4. Growth parameters and estimated fetal weight were consistent with established dates.  5. The amniotic fluid volume appeared normal.    Assessment:   Ms. Dina Pena is a 33 year old  , at 23w4d who presented with regular contractions with cerclage in place.  Cerclage was removed given ongoing contractions on 2023.   Her cervix remained stable since and contractions have improved     Plan  # Labor   -previously reviewed risk of  delivery, though discussed that we are happy symptoms have improved  -s/p BMTZ#1 and #2 on -2023  -Given significant contractions on admission, given mag, indocin for 48 hours. Stopped 9/3/2023 at 3PM                -PCN for GBS+ status stopped at 9/3/23 at 3PM                -s/p NICU consult               -s/p removal of Landin Cerclage on 2023               -seen recently on  with negative G/C, neg wet prep.    -was diagnosed with BV locally started 7 day course of flagyl on , will continue through 2023               - UA without LE or nitrites      -Will check presentation if labors.  She notes she would be interested in vaginal delivery if she labors in vertex presentation.      -Signed consent and reviewed risks if classical C section indicated for presentation or fetal distress.    - Restarted on vaginal progesterone   - Social work consulted for antepartum assessment, appreciate recommendations       #UTDA1, bilateral  -seen on US on 9/3/2023  -has had LR CFFDNA  -will continue to follow (at 28 weeks if still pregnant) and will let NICU know if delivers prior to then.     #PNC  -Previous prenatal care with Dr. Tianna Ellis at [facility], need to collect outside records  -A pos, Ab negative, Syphilis negative, GC/CT negative  -Pending Rubella, HIV, HBV  -VTE prophylaxis: Lovenox 40mg daily    Dispo: given risk of  delivery at this early gestational age, recommend ongoing inpatient management     Patient discussed and staffed with Dr. Denia Forrest.    Lobo Mccall, MS4    Physician Attestation   I saw this patient with the medical student and agree with the students's findings and plan of  care as documented in the note.      34 yo  at 23w4d admitted with concern for  labor.  Cerclage removed on admission. Contractions have since stopped. S/p 2X BMTZ.  Given early gestational age, will plan to continue inpatient management for the time being.  Given no contractions today, will start lovenox for DVT prophylaxis.  SW consult ordered.     Denia Forrest MD  Date of Service (when I saw the patient): 23     I spent a total of 25 minutes on the date of this encounter including preparing to see the patient (reviewing medical records/tests), counseling and discussing the plan of care, documenting the visit in the electronic medical record, and communicating with other health care professionals and/or care coordination.

## 2023-09-04 NOTE — PROVIDER NOTIFICATION
09/04/23 1000   Provider Notification   Provider Name/Title Dr. Forrest, Dr. Jensen, Med student   Method of Notification At Bedside   Request Evaluate in Person   Notification Reason Status Update     Providers here for morning rounds. EFM strip review, FHR normal baseline, minimal variability with occasional moderate variability. Occasional 10x10 accelerations, episodic or intermittent decelerations.AGA strip. No new questions or concerns. No s/s of infection. No LOF or bleeding. Continue with expectant management.

## 2023-09-04 NOTE — DISCHARGE SUMMARY
[facility] Discharge Summary    Dina Pena MRN# [id number]   Age: 33 year old YOB: 1990     Date of Admission:  2023  Date of Discharge::  2023  Admitting Physician:  Denia Forrest MD  Discharge Physician:  Edna Castillo MD            Admission Diagnoses:   -  at 23w1d   - Cervical Insufficency s/p Landin cerclage placement   - Hx L4-L5 Lumbar fusion  - Positive blood antibody, unidentified          Discharge Diagnosis:   -  now 24w0d  -  labor, stable  - Cervical insufficiency, now s/p cerclage removal given PTL            Procedures:     Procedure(s): Landin Cerclage Removal       No procedures performed during this admission           Medications Prior to Admission:     Medications Prior to Admission   Medication Sig Dispense Refill Last Dose    aspirin 81 MG EC tablet Take 81 mg by mouth daily   2023    hydrOXYzine (ATARAX) 25 MG tablet Take 1 tablet (25 mg) by mouth every 4 hours as needed (muscle spasms, nausea) 60 tablet 0 Unknown    oxyCODONE-acetaminophen (PERCOCET) 5-325 MG per tablet Take 1-2 tablets by mouth every 4 hours as needed for pain (moderate to severe) 80 tablet 0 Unknown    Prenatal Vit-Fe Fumarate-FA (PRENATAL MULTIVITAMIN  PLUS IRON) 27-1 MG TABS Take by mouth daily   2023    progesterone (PROMETRIUM) 200 MG capsule Place 1 capsule (200 mg) vaginally daily for 30 days 30 capsule 0 2023    senna-docusate (SENOKOT-S;PERICOLACE) 8.6-50 MG per tablet Take 1-2 tablets by mouth daily as needed for constipation 15 tablet 0 Past Month    [DISCONTINUED] metroNIDAZOLE (FLAGYL) 500 MG tablet Take 500 mg by mouth 2 times daily   2023             Discharge Medications:        Review of your medicines        CONTINUE these medicines which have NOT CHANGED        Dose / Directions   aspirin 81 MG EC tablet      Dose: 81 mg  Take 81 mg by mouth daily  Refills: 0     hydrOXYzine 25 MG tablet  Commonly known as:  ATARAX  Used for: Other closed extra-articular fracture of distal end of left radius with malunion      Dose: 25 mg  Take 1 tablet (25 mg) by mouth every 4 hours as needed (muscle spasms, nausea)  Quantity: 60 tablet  Refills: 0     oxyCODONE-acetaminophen 5-325 MG tablet  Commonly known as: PERCOCET  Used for: Other closed extra-articular fracture of distal end of left radius with malunion      Dose: 1-2 tablet  Take 1-2 tablets by mouth every 4 hours as needed for pain (moderate to severe)  Quantity: 80 tablet  Refills: 0     prenatal multivitamin  plus iron 27-1 MG Tabs      Take by mouth daily  Refills: 0     progesterone 200 MG capsule  Commonly known as: PROMETRIUM  Used for: Short cervix affecting pregnancy      Dose: 200 mg  Place 1 capsule (200 mg) vaginally daily for 30 days  Quantity: 30 capsule  Refills: 0     senna-docusate 8.6-50 MG tablet  Commonly known as: SENOKOT-S/PERICOLACE  Used for: Other closed extra-articular fracture of distal end of left radius with malunion      Dose: 1-2 tablet  Take 1-2 tablets by mouth daily as needed for constipation  Quantity: 15 tablet  Refills: 0            STOP taking      metroNIDAZOLE 500 MG tablet  Commonly known as: FLAGYL                       Consultations:   - NICU  - Anesthesia          Brief History of Admission and Antepartum Course:   Dina Pena is a 33 year old  at 23w1d by LMP c/w 7w0d US, here with complaints of contractions s/p cerclage placement 2023. Pregnancy complicated as below. No history of hypertension or asthma. She states that she is feeling okay today. She has been having contractions lasting ~30 seconds, spaced 2-4 minutes apart. Denies LOF or VB. Good FM.     Patient was admitted and due to ongoing contractions decision was made to remove her Landin cerclage. She was administered indomethacin for tocolysis. On admission she also received magnesium for fetal neuroprotection and received a full course of betamethasone.  Indomethacin and magnesium were continued for 48 hours post admission           Discharge Instructions and Follow-Up:     Discharge Instructions:  Call or present to labor and delivery if you experience:   -Regular painful contractions concerning for labor   -Leakage of fluid concerning for ruptured membranes   -Decreased fetal movement   -Bright red vaginal bleeding    -Headache, vision changes, upper abdominal pain, significant increase in swelling,   generalized unwell feeling    Follow up in [facility] clinic 28 weeks for UTDA1  Follow up with primary OB, consider weekly visits for 2-3 weeks then routine prenatal care if stable         Discharge Disposition:     Discharged to home      Gisella Jensen MD  ObGyn Resident, PGY-4  09/07/2023

## 2023-09-04 NOTE — PLAN OF CARE
"  Problem: Plan of Care - These are the overarching goals to be used throughout the patient stay.    Goal: Plan of Care Review  Description: The Plan of Care Review/Shift note should be completed every shift.  The Outcome Evaluation is a brief statement about your assessment that the patient is improving, declining, or no change.  This information will be displayed automatically on your shift note.  Outcome: Progressing  Flowsheets (Taken 9/4/2023 9271)  Plan of Care Reviewed With:   patient   spouse  Overall Patient Progress: improving  Goal: Patient-Specific Goal (Individualized)  Description: You can add care plan individualizations to a care plan. Examples of Individualization might be:  \"Parent requests to be called daily at 9am for status\", \"I have a hard time hearing out of my right ear\", or \"Do not touch me to wake me up as it startles me\".  Outcome: Progressing  Goal: Absence of Hospital-Acquired Illness or Injury  Outcome: Progressing  Intervention: Prevent Skin Injury  Recent Flowsheet Documentation  Taken 9/4/2023 0740 by Summer Nicole RN  Body Position: position changed independently  Goal: Optimal Comfort and Wellbeing  Outcome: Progressing  Intervention: Provide Person-Centered Care  Recent Flowsheet Documentation  Taken 9/4/2023 0740 by Summer Nicole RN  Trust Relationship/Rapport:   care explained   choices provided  Goal: Readiness for Transition of Care  Outcome: Progressing     Problem: Labor  Goal: Hemostasis  Outcome: Progressing  Goal: Stable Fetal Wellbeing  Outcome: Progressing  Intervention: Promote and Monitor Fetal Wellbeing  Recent Flowsheet Documentation  Taken 9/4/2023 0740 by Summer Nicole RN  Body Position: position changed independently  Goal: Effective Progression to Delivery  Outcome: Progressing  Goal: Absence of Infection Signs and Symptoms  Outcome: Progressing  Goal: Acceptable Pain Control  Outcome: Progressing  Goal: Normal Uterine Contraction Pattern  Outcome: " Progressing   Goal Outcome Evaluation:      Plan of Care Reviewed With: patient, spouse    Overall Patient Progress: improvingOverall Patient Progress: improving

## 2023-09-05 ENCOUNTER — TRANSFERRED RECORDS (OUTPATIENT)
Dept: HEALTH INFORMATION MANAGEMENT | Facility: CLINIC | Age: 33
End: 2023-09-05

## 2023-09-05 LAB
HBV SURFACE AG SERPL QL IA: NONREACTIVE
HIV 1+2 AB+HIV1 P24 AG SERPL QL IA: NONREACTIVE

## 2023-09-05 PROCEDURE — 59025 FETAL NON-STRESS TEST: CPT | Mod: 26 | Performed by: OBSTETRICS & GYNECOLOGY

## 2023-09-05 PROCEDURE — 99232 SBSQ HOSP IP/OBS MODERATE 35: CPT | Mod: 25 | Performed by: OBSTETRICS & GYNECOLOGY

## 2023-09-05 PROCEDURE — 250N000013 HC RX MED GY IP 250 OP 250 PS 637

## 2023-09-05 PROCEDURE — 250N000011 HC RX IP 250 OP 636: Mod: JZ | Performed by: STUDENT IN AN ORGANIZED HEALTH CARE EDUCATION/TRAINING PROGRAM

## 2023-09-05 PROCEDURE — 250N000013 HC RX MED GY IP 250 OP 250 PS 637: Performed by: STUDENT IN AN ORGANIZED HEALTH CARE EDUCATION/TRAINING PROGRAM

## 2023-09-05 PROCEDURE — 120N000002 HC R&B MED SURG/OB UMMC

## 2023-09-05 RX ORDER — PROCHLORPERAZINE MALEATE 5 MG
10 TABLET ORAL EVERY 6 HOURS PRN
Status: DISCONTINUED | OUTPATIENT
Start: 2023-09-05 | End: 2023-09-07 | Stop reason: HOSPADM

## 2023-09-05 RX ORDER — ONDANSETRON 4 MG/1
4 TABLET, ORALLY DISINTEGRATING ORAL EVERY 6 HOURS PRN
Status: DISCONTINUED | OUTPATIENT
Start: 2023-09-05 | End: 2023-09-07 | Stop reason: HOSPADM

## 2023-09-05 RX ORDER — ONDANSETRON 2 MG/ML
4 INJECTION INTRAMUSCULAR; INTRAVENOUS EVERY 6 HOURS PRN
Status: DISCONTINUED | OUTPATIENT
Start: 2023-09-05 | End: 2023-09-07 | Stop reason: HOSPADM

## 2023-09-05 RX ORDER — METOCLOPRAMIDE 10 MG/1
10 TABLET ORAL EVERY 6 HOURS PRN
Status: DISCONTINUED | OUTPATIENT
Start: 2023-09-05 | End: 2023-09-07 | Stop reason: HOSPADM

## 2023-09-05 RX ORDER — PROCHLORPERAZINE 25 MG
25 SUPPOSITORY, RECTAL RECTAL EVERY 12 HOURS PRN
Status: DISCONTINUED | OUTPATIENT
Start: 2023-09-05 | End: 2023-09-07 | Stop reason: HOSPADM

## 2023-09-05 RX ORDER — METOCLOPRAMIDE HYDROCHLORIDE 5 MG/ML
10 INJECTION INTRAMUSCULAR; INTRAVENOUS EVERY 6 HOURS PRN
Status: DISCONTINUED | OUTPATIENT
Start: 2023-09-05 | End: 2023-09-07 | Stop reason: HOSPADM

## 2023-09-05 RX ADMIN — PRENATAL VITAMINS-IRON FUMARATE 27 MG IRON-FOLIC ACID 0.8 MG TABLET 1 TABLET: at 07:50

## 2023-09-05 RX ADMIN — SENNOSIDES AND DOCUSATE SODIUM 1 TABLET: 50; 8.6 TABLET ORAL at 20:41

## 2023-09-05 RX ADMIN — ASPIRIN 81 MG: 81 TABLET, COATED ORAL at 07:50

## 2023-09-05 RX ADMIN — ENOXAPARIN SODIUM 40 MG: 40 INJECTION SUBCUTANEOUS at 20:41

## 2023-09-05 RX ADMIN — PROGESTERONE 200 MG: 200 CAPSULE ORAL at 20:41

## 2023-09-05 RX ADMIN — SENNOSIDES AND DOCUSATE SODIUM 1 TABLET: 50; 8.6 TABLET ORAL at 07:50

## 2023-09-05 ASSESSMENT — ACTIVITIES OF DAILY LIVING (ADL)
ADLS_ACUITY_SCORE: 18

## 2023-09-05 NOTE — PROGRESS NOTES
"   09/05/23 1718   Child Life   Location [facility]   Method in-person   Individuals Present Patient;Caregiver/Adult Family Member;Siblings/Child Family Members   Comments (names or other info) Patient, , and daughter Negin   Intervention Developmental Play;Physical Space Tour   Developmental Play Comment Family came to check out the space. Daughter engaged in developmentally-appropiate play with toys from \"[employer]\"   Time Spent   Direct Patient Care 15   Indirect Patient Care 5   Total Time Spent (Calc) 20       "

## 2023-09-05 NOTE — PLAN OF CARE
"Goal Outcome Evaluation:      Plan of Care Reviewed With: patient    Overall Patient Progress: no changeOverall Patient Progress: no change         Denies LOF or vaginal bleeding, +FM, had a brief period of \"cramping\" at the beginning of her afternoon monitoring session. It resolved within 30 minutes. Uterus soft and non-tender.  Daughter and  here for a visit at 1615. No other complaints. Continue plan of care.  "

## 2023-09-05 NOTE — PROGRESS NOTES
"Social Work Initial Consult    DATA/ASSESSMENT    General Information  Assessment completed with: Dina Pena  Type of visit: Psychosocial Assessment      Reason for Consult: Antepartum Assessment    SW received order to see patient for an antepartum assessment.  SW met with Dina in her antepartum room.  Dina is waiting to learn the gender of her baby at birth.    Living Environment:   Dina lives in a home in [place] with her  (C[place]) and daughter (Negin, almost 18 months).      Family Factors  Family Risk Factors: high risk pregnancy  Family Strength Factors: engaged with SW, able to advocate for self, local family, experienced parents    Assessment of Support  Description of Support System: Supportive  Support Assessment: Adequate family and caregiver support, Adequate social supports  Dina reports her sister and brother live nearby and are supportive.  Her parents live in [place], but flew back to [place] to support the family during this time.  Cleveland's siblings and father also live nearby and are supportive.      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  She gets 16 weeks of paid maternity leave.  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  She is going to wait until Friday to hear from her providers, and decide at that time if she will apply for FMLA for this antepartum stay.  Dina has R insurance coverage and is aware of how to add her new baby when they are born.      NIDIA provided information about discounted parking passes.    Coping/Stress  Dina denies having postpartum mood concerns with her first child.  She indicates she had depression/anxiety in college, but has not had concerns since graduating.  Dina reports she is holding up okay today, but describes experiencing emotional \"whiplash\" the past few days upon learning more about her and baby's prognosis.  NIDIA provided space " for Dina to process the unknowns of her pregnancy journey.  She states [place] is having a hard time with this experience.  He left the hospital today for the first time; Dina thinks it will be healthy for him to have some time away from the hospital environment.  SW provided brochure and information about [facility] [place], and encouraged Dina to reach out to SW if they are experiencing increased mood concerns.          Additional Information:  Dina indicates her and her  would likely want to tour the NICU space if possible.    Dina indicates she initially planned to give birth at [facility].     INTERVENTION  Conducted chart review and consulted with medical team regarding plan of care. Introduced SW role and scope of practice.   Provided orientation to the unit  Provided assessment of patient and family's level of coping  Conducted psychosocial assessment   Validated emotions and provided supportive listening  Provided information about discounted parking passes.  Provided SW contact info    PLAN    SW will continue to follow for supportive intervention.     Kalley Thurner, MSW, [facility]  Maternal and Child Health   Office: [phone]  Pager: [phone]  After Hours Pager: [phone]  [email or web address]

## 2023-09-05 NOTE — PROGRESS NOTES
[facility] Antepartum Progress Note    Subjective:   Patient with no acute concerns this morning. Last night she experienced some thicker vaginal discharge than normal after beginning her vaginal progesterone therapy, but states that it has resolved since this morning. +FM. No VB. Denies contractions.    Plan for discharge potentially on Friday () was discussed with the patient. Return precautions were detailed with the patient and she was agreeable to the tentative plan.      Objective:  Vitals:    23 0740 23 1400 23 2138 23 0648   BP:  106/56 101/60 101/57   BP Location:    Right arm   Patient Position:    Semi-Stearns's   Cuff Size:    Adult Regular   Pulse:       Resp: 16 16 20 18   Temp:  98.4  F (36.9  C) 98.3  F (36.8  C) 98.1  F (36.7  C)   TempSrc:  Oral Oral Oral   SpO2:       Weight:       Height:         Gen: Resting comfortably in bed, NAD  CV: appears well perfused  Resp: easy respiratory effort   Abd: Gravid, non-tender, non-distended  Ext: non-tender, no edema    FHT: , mo variability, +10x10 accels, intermittent variable decels  [place]: uterine irritability    US Imagin/3/2023  IMPRESSION  ---------------------------------------------------------------------------------------------------------  1) Padgett intrauterine pregnancy at 23w 3d gestation.  2) There is bilateral fetal urinary tract dilation (UTD-A1) with the right renal pelvis measuring 5.66 mm and the left renal pelvis measuring 5.96 mm. There is no calyceal  dilation and the renal parenchyma is normal in appearance. No evidence of hydroureter and normal bladder.  3. No other fetal anomalies commonly detected by ultrasound or soft markers of aneuploidy were identified in the detailed fetal anatomic survey within the limits of prenatal  ultrasound.  4. Growth parameters and estimated fetal weight were consistent with established dates.  5. The amniotic fluid volume appeared normal.    Assessment/Plan:   MsTaylor  Dina Pena is a 33 year old  at 23w5d who presented with regular contractions with cerclage in place.  Cerclage was removed given ongoing contractions on 2023.   Her cervix remains stable since and contractions have resolved.    # Labor   -previously reviewed risk of  delivery, though discussed that we are happy symptoms have improved  -s/p BMTZ#1 and #2 on -2023  -Given significant contractions on admission, given mag, indocin for 48 hours. Stopped 9/3/2023 at 3PM                -PCN for GBS+ status stopped at 9/3/23 at 3PM                -s/p NICU consult               -s/p removal of Landin Cerclage on 2023; Cerclage placed  due to 7mm cervical length on US exam               -seen recently on  with negative G/C, neg wet prep.    -was diagnosed with BV locally started 7 day course of flagyl on , will continue through 2023               - UA without LE or nitrites      -Will check presentation if labors.  She notes she would be interested in vaginal delivery if she labors in vertex presentation.      -Signed consent and reviewed risks if classical C section indicated for presentation or fetal distress.    - Restarted on vaginal progesterone, per patient request   - Social work consulted for antepartum assessment, appreciate recommendations       #UTDA1, bilateral  -seen on US on 9/3/2023  -has had LR CFFDNA  -will continue to follow (at 28 weeks if still pregnant) and will let NICU know if delivers prior to then.     #PNC  -Previous prenatal care with Dr. Tianna Ellis at [facility], need to obtain outside records  -A pos, Ab negative, Syphilis negative, GC/CT negative  -Pending Rubella, HIV, HBV  -VTE prophylaxis: Lovenox 40mg daily    Dispo: Given risk of  delivery at this early gestational age, recommend ongoing inpatient monitoring. If patient remains stable, can plan for discharge home at approximately 24wk () with return  "precautions.    Patient discussed and staffed with Dr. Bambi Castillo.    Lobo Mccall, MS4    [facility] Attending Attestation  I saw this patient with the resident and agree with the resident's findings and plan of care as documented in the resident's note. I personally reviewed her vital signs, medications, labs, pertinent imaging, and fetal monitoring. In summary, Ms. Pena is a 34yo  at 23w5d admitted with threatened  labor with an ultrasound indicated cerclage placed  now s/p cerclage removal with a stable cervical exam (1cm dilated) and restarted on vaginal progesterone. She received BMZ - and tocolysis for 48 hours. She has met with the NICU and desires full resuscitation. Fetus was cephalic on last ultrasound, she has a history of CS x 1. She has been consented for  including classical if indicated, but if cephalic would desire TOLAC. Both maternal and fetal status are stable and reassuring and will plan to continue inpatient management - if ongoing stability consider discharge Friday at 24 weeks.    /57 (BP Location: Right arm, Patient Position: Semi-Stearns's, Cuff Size: Adult Regular)   Pulse 54   Temp 98.1  F (36.7  C) (Oral)   Resp 18   Ht 1.753 m (5' 9\")   Wt 72.6 kg (160 lb)   LMP 2023 (Approximate)   SpO2 100%   BMI 23.63 kg/m        FHT: Baseline 140s, moderate variability, +accels, no decels  TOCO: None  NST interpretation for today: reactive, reassuring and appropriate for GA    Time Spent on this Encounter   I spent a total of 45 minutes face-to-face or coordinating care of Ms. Pena. Over 50% of my time on the unit was spent counseling the patient and /or coordinating care regarding diagnosis, diagnostic results, prognosis and risks and benefits of treatment options.     Date of service (when I saw the patient): 2023     Edna Castillo MD MAS  , OB/GYN  Maternal-Fetal Medicine  [phone] (Pager)         "

## 2023-09-05 NOTE — PROVIDER NOTIFICATION
09/04/23 2300   Provider Notification   Provider Name/Title Dr. Dietz   Method of Notification Electronic Page   Request Evaluate - Remote   Notification Reason Other (Comment)     Pt reports increased discharge, yellowish-white and not watery. No increase in cxns, VSS. Monitoring was AGA.   Response: Notify if it continues.

## 2023-09-05 NOTE — PLAN OF CARE
Pt has felt an occasional contraction, nothing painful or regular. Baby moving. No cxns noted on monitor. Pt denies bleeding or leaking fluid. Spouse at bedside and supportive.

## 2023-09-05 NOTE — PLAN OF CARE
"Patient returned from wheel chair ride with  and daughter, she reported feeling 3 \"cramps\" and wanted to be monitored. Monitor on for one hour, she felt 3 more at the beginning of monitoring, and then got up to have a BM, normal stool,  she then reported her cramping resolved. EFM discontinued, continue plan of care.  "

## 2023-09-05 NOTE — PLAN OF CARE
Labor Shift Note  Data: Contraction pattern  no contractions . Fetal assessment Appropriate for Gestational Age.  Betamethasone Completed given on   &  .  Magnesium Sulfate for neuroprotection Completed on  . Patient denies any LOF, bleeding, abodminal cramping or pre-ec symptoms.  Interventions: Continue uterine/fetal assessment 3 times daily. Activity level:Regular activity, and preventive measures including Medications, Positioning, and Frequent voiding. Encourage active range of motion and frequent position changes.  Plan: Continue expectant management. Observe for and notify care provider of indications of progressing labor or signs of fetal/maternal compromise.

## 2023-09-06 PROCEDURE — 250N000013 HC RX MED GY IP 250 OP 250 PS 637

## 2023-09-06 PROCEDURE — 120N000002 HC R&B MED SURG/OB UMMC

## 2023-09-06 PROCEDURE — 250N000011 HC RX IP 250 OP 636: Mod: JZ | Performed by: STUDENT IN AN ORGANIZED HEALTH CARE EDUCATION/TRAINING PROGRAM

## 2023-09-06 PROCEDURE — 250N000013 HC RX MED GY IP 250 OP 250 PS 637: Performed by: STUDENT IN AN ORGANIZED HEALTH CARE EDUCATION/TRAINING PROGRAM

## 2023-09-06 PROCEDURE — 99233 SBSQ HOSP IP/OBS HIGH 50: CPT | Mod: 25 | Performed by: OBSTETRICS & GYNECOLOGY

## 2023-09-06 PROCEDURE — 59025 FETAL NON-STRESS TEST: CPT | Mod: 26 | Performed by: OBSTETRICS & GYNECOLOGY

## 2023-09-06 RX ADMIN — ENOXAPARIN SODIUM 40 MG: 40 INJECTION SUBCUTANEOUS at 21:12

## 2023-09-06 RX ADMIN — PRENATAL VITAMINS-IRON FUMARATE 27 MG IRON-FOLIC ACID 0.8 MG TABLET 1 TABLET: at 08:26

## 2023-09-06 RX ADMIN — ASPIRIN 81 MG: 81 TABLET, COATED ORAL at 08:26

## 2023-09-06 RX ADMIN — SENNOSIDES AND DOCUSATE SODIUM 1 TABLET: 50; 8.6 TABLET ORAL at 08:26

## 2023-09-06 RX ADMIN — PROGESTERONE 200 MG: 200 CAPSULE ORAL at 21:15

## 2023-09-06 RX ADMIN — SENNOSIDES AND DOCUSATE SODIUM 1 TABLET: 50; 8.6 TABLET ORAL at 21:11

## 2023-09-06 ASSESSMENT — ACTIVITIES OF DAILY LIVING (ADL)
ADLS_ACUITY_SCORE: 18

## 2023-09-06 NOTE — PROGRESS NOTES
*Late entry secondary to patient care    Brief Progress Note     Went to visit patient this evening. Had been having some thicker vaginal discharge after beginning her progesterone therapy yesterday but decreased throughout the day today. No contractions. No vaginal bleeding. No leakage of fluid. Good fetal movement.   Offered a speculum exam, patient declines at this time.     NST this evening reactive and reassuring, appropriate for GA.     Patient will notify with any concerns.     Jinny Dietz MD   OBGYN resident PGY3  09/06/2023 12:44 AM

## 2023-09-06 NOTE — PLAN OF CARE
Goal Outcome Evaluation:      Plan of Care Reviewed With: patient, spouse    Overall Patient Progress: no changeOverall Patient Progress: no change       Daily rounding with Dr Castillo and Dr Espinal. Reviewed EFM tracing from this morning. Patient and  asking questions about statistical chances for  delivery. Reinforcing  labor assessment and management. Patient comforted by the fact that she lives close to [facility] incase she needs assessment in the future. Continue plan of care, reassess on Friday for possible discharge home.

## 2023-09-06 NOTE — PROVIDER NOTIFICATION
09/06/23 0654   Provider Notification   Provider Name/Title Dr Dietz   Method of Notification Electronic Page   Request Evaluate - Remote   Notification Reason Decels     Alerted MD to possible cluster of decels (audible but disconnected) from [id number] difficult tracing due to fetal movement. Plan to extend monitoring and have oncoming nurse review strip with oncoming resident.

## 2023-09-06 NOTE — PLAN OF CARE
VSS on room air, afebrile. Denies contractions, leaking, bleeding. Denies pre-e symptoms. Strip appropriate for gestational age, see flowsheet. No contractions noted on monitoring at this time.

## 2023-09-06 NOTE — PROGRESS NOTES
09/06/23 1711   Child Life   Location [facility] End Zone   Method in-person   Individuals Present Patient;Caregiver/Adult Family Member;Siblings/Child Family Members   Intervention Developmental Play   Developmental Play Comment Daughter Negin played with sports equipment and coloring sheets.   Time Spent   Direct Patient Care 30   Indirect Patient Care 5   Total Time Spent (Calc) 35

## 2023-09-06 NOTE — PROGRESS NOTES
[facility] Antepartum Progress Note    Subjective:   Patient with no acute concerns this morning. Patient experienced episode of cramping yesterday evening that resolved with bowel movement. +FM. No VB. No leakage of fluid. Denies contractions.    Objective:  Vitals:    23 2050 23 0223 23 0604 23 1430   BP: 117/66 100/54 103/58 110/55   BP Location: Right arm      Patient Position: Semi-Stearns's      Cuff Size: Adult Regular      Pulse:       Resp: 16 14 14 20   Temp: 98.1  F (36.7  C) 98.1  F (36.7  C) 98.1  F (36.7  C) 98.4  F (36.9  C)   TempSrc: Oral Oral Oral Oral   SpO2:       Weight:       Height:         Gen: Resting comfortably in bed, NAD  CV: appears well perfused  Resp: easy respiratory effort   Abd: Gravid, non-tender, non-distended  Ext: non-tender, no edema    FHT: , mo variability, +10x10 accels, no decels  [place]: no contractions    US Imagin/3/2023  IMPRESSION  ---------------------------------------------------------------------------------------------------------  1) Padgett intrauterine pregnancy at 23w 3d gestation.  2) There is bilateral fetal urinary tract dilation (UTD-A1) with the right renal pelvis measuring 5.66 mm and the left renal pelvis measuring 5.96 mm. There is no calyceal  dilation and the renal parenchyma is normal in appearance. No evidence of hydroureter and normal bladder.  3. No other fetal anomalies commonly detected by ultrasound or soft markers of aneuploidy were identified in the detailed fetal anatomic survey within the limits of prenatal  ultrasound.  4. Growth parameters and estimated fetal weight were consistent with established dates.  5. The amniotic fluid volume appeared normal.    Assessment/Plan:   Ms. Dina Pena is a 33 year old  at 23w6d who presented with regular contractions with cerclage in place.  Cerclage was removed given ongoing contractions on 2023.   Her cervix remains stable since and contractions have  resolved.    # Labor   -previously reviewed risk of  delivery, though discussed that we are happy symptoms have improved  -s/p BMZ -  -Given significant contractions on admission, given mag, indocin for 48 hours. Stopped 9/3/2023 at 3PM                -PCN for GBS+ status stopped at 9/3/23 at 3PM                -s/p NICU consult               -s/p removal of Landin Cerclage on 2023; Cerclage placed  due to 7mm cervical length on US exam               -seen recently on  with negative G/C, neg wet prep.    -was diagnosed with BV locally started 7 day course of flagyl on , will continue through 2023               - UA without LE or nitrites      -Will check presentation if labors.  She notes she would be interested in vaginal delivery if she labors in vertex presentation.      -Signed consent and reviewed risks if classical C section indicated for presentation or fetal distress.    - Restarted on vaginal progesterone, per patient request   - Social work consulted, will continue to follow       #UTDA1, bilateral  -seen on US on 9/3/2023  -has had LR CFFDNA  -will continue to follow (at 28 weeks if still pregnant) and will let NICU know if delivers prior to then.     #PNC  -Previous prenatal care with Dr. Tianna Ellis at [facility], need to obtain outside records  -A pos, Ab negative, Syphilis negative, GC/CT negative  -Pending Rubella, HIV, HBV  -VTE prophylaxis: Lovenox 40mg daily    Dispo: Given risk of  delivery at this early gestational age, recommend ongoing inpatient monitoring. If patient remains stable, can plan for discharge home at approximately 24wk () with return precautions.    Patient discussed and staffed with Dr. Bambi Castillo.    Lobo Mccall, MS4    MFM Attending Attestation  I saw this patient with the resident and agree with the resident's findings and plan of care as documented in the resident's note. I personally reviewed her vital  "signs, medications, labs, pertinent imaging, and fetal monitoring. In summary, Ms. Pena is a 32yo  at 23w6d admitted with threatened  labor with an ultrasound indicated cerclage placed  now s/p cerclage removal with a stable cervical exam (1cm dilated) and restarted on vaginal progesterone. She received BMZ - and tocolysis for 48 hours. She has met with the NICU and desires full resuscitation. Fetus was cephalic on last ultrasound, she has a history of CS x 1. She has been consented for  including classical if indicated, but if cephalic would desire TOLAC. Both maternal and fetal status are stable and reassuring and will plan to continue inpatient management - if ongoing stability consider discharge Friday at 24 weeks, will plan for repeat speculum exam Friday morning in anticipation of this.     /55   Pulse 54   Temp 98.4  F (36.9  C) (Oral)   Resp 20   Ht 1.753 m (5' 9\")   Wt 71.2 kg (157 lb)   LMP 2023 (Approximate)   SpO2 100%   BMI 23.18 kg/m        FHT: Baseline 145, moderate variability, +accels, no decels  TOCO: None  NST interpretation for today: reactive, reassuring and appropriate for GA    Time Spent on this Encounter   I spent a total of 65 minutes face-to-face or coordinating care of Ms. Pena. Over 50% of my time on the unit was spent counseling the patient and /or coordinating care regarding diagnosis, diagnostic results, prognosis and risks and benefits of treatment options.     Date of service (when I saw the patient): 2023     Edna Castillo MD MAS  , OB/GYN  Maternal-Fetal Medicine  [phone] (Pager)           "

## 2023-09-07 VITALS
BODY MASS INDEX: 23.25 KG/M2 | WEIGHT: 157 LBS | HEIGHT: 69 IN | TEMPERATURE: 98.2 F | RESPIRATION RATE: 18 BRPM | DIASTOLIC BLOOD PRESSURE: 62 MMHG | SYSTOLIC BLOOD PRESSURE: 99 MMHG | HEART RATE: 54 BPM | OXYGEN SATURATION: 100 %

## 2023-09-07 DIAGNOSIS — O26.90 PREGNANCY RELATED CONDITION, ANTEPARTUM: Primary | ICD-10-CM

## 2023-09-07 PROCEDURE — 250N000013 HC RX MED GY IP 250 OP 250 PS 637

## 2023-09-07 PROCEDURE — 99239 HOSP IP/OBS DSCHRG MGMT >30: CPT | Mod: 25 | Performed by: OBSTETRICS & GYNECOLOGY

## 2023-09-07 PROCEDURE — 59025 FETAL NON-STRESS TEST: CPT | Mod: 26 | Performed by: OBSTETRICS & GYNECOLOGY

## 2023-09-07 RX ADMIN — PRENATAL VITAMINS-IRON FUMARATE 27 MG IRON-FOLIC ACID 0.8 MG TABLET 1 TABLET: at 08:05

## 2023-09-07 RX ADMIN — ASPIRIN 81 MG: 81 TABLET, COATED ORAL at 08:05

## 2023-09-07 RX ADMIN — SENNOSIDES AND DOCUSATE SODIUM 1 TABLET: 50; 8.6 TABLET ORAL at 08:06

## 2023-09-07 ASSESSMENT — ACTIVITIES OF DAILY LIVING (ADL)
ADLS_ACUITY_SCORE: 18

## 2023-09-07 NOTE — DISCHARGE INSTRUCTIONS
Discharge Instruction for Undelivered Patients      You were seen for: Labor Assessment  We Consulted: Dr. Forrest and Dr. Castillo  You had (Test or Medicine):monitoring, cerclage removal    Diet:   Drink 8 to 12 glasses of liquids (milk, juice, water) every day.  You may eat meals and snacks.     Activity:  Rest the pelvic area. No sex. Do not stimulate breasts or nipples.     Call your provider if you notice:  Swelling in your face or increased swelling in your hands or legs.  Headaches that are not relieved by Tylenol (acetaminophen).  Changes in your vision (blurring: seeing spots or stars.)  Nausea (sick to your stomach) and vomiting (throwing up).   Weight gain of 5 pounds or more per week.  Heartburn that doesn't go away.  Signs of bladder infection: pain when you urinate (use the toilet), need to go more often and more urgently.  The bag of kaufman (rupture of membranes) breaks, or you notice leaking in your underwear.  Bright red blood in your underwear.  Abdominal (lower belly) or stomach pain.  For first baby: Contractions (tightening) less than 5 minutes apart for one hour or more.  Second (plus) baby: Contractions (tightening) less than 10 minutes apart and getting stronger.  *If less than 34 weeks: Contractions (tightening) more than 6 times in one hour.  Increase or change in vaginal discharge (note the color and amount    Follow-up:  As scheduled in the clinic

## 2023-09-07 NOTE — PROGRESS NOTES
[facility] Antepartum Progress Note    Subjective:   Reports no concerns today. She felt very mildly crampy last night, but it resolved after less than an hour and feeling fine now. Complains of some pelvic heaviness after a long walk, but it resolves with rest. Denies contractions, leaking fluid or bleeding. Noting normal fetal movement.    Objective:  Vitals:    23 0604 23 1430 23 0607   BP: 103/58 110/55 108/62 99/62   BP Location:   Right arm Right arm   Patient Position:   Semi-Stearns's Semi-Stearns's   Cuff Size:   Adult Regular Adult Regular   Pulse:       Resp: 14 20 18 18   Temp: 98.1  F (36.7  C) 98.4  F (36.9  C) 98.6  F (37  C) 98.2  F (36.8  C)   TempSrc: Oral Oral Oral Oral   SpO2:       Weight:       Height:         Gen: Resting comfortably in bed, NAD  CV: appears well perfused  Resp: easy respiratory effort   Abd: Gravid, non-tender, non-distended  Ext: non-tender, no edema  SVE: /high    FHT: , mo variability, +10x10 accels, no decels  [place]: no contractions    US Imagin/3/2023  IMPRESSION  ---------------------------------------------------------------------------------------------------------  1) Padgett intrauterine pregnancy at 23w 3d gestation.  2) There is bilateral fetal urinary tract dilation (UTD-A1) with the right renal pelvis measuring 5.66 mm and the left renal pelvis measuring 5.96 mm. There is no calyceal  dilation and the renal parenchyma is normal in appearance. No evidence of hydroureter and normal bladder.  3. No other fetal anomalies commonly detected by ultrasound or soft markers of aneuploidy were identified in the detailed fetal anatomic survey within the limits of prenatal  ultrasound.  4. Growth parameters and estimated fetal weight were consistent with established dates.  5. The amniotic fluid volume appeared normal.    Assessment/Plan:   Ms. Dina Pena is a 33 year old  at 24w0d c/w 7w0d US who presented with regular  contractions with cerclage in place.  Cerclage was removed given ongoing contractions on 2023.   Her cervix remains stable since and contractions have resolved.    #  Labor  s/p removal of Landin Cerclage on 2023; Cerclage placed  due to 7mm cervical length on US exam. Previously reviewed risk of  delivery, seems stable at this time given unchanged cervical exam today from admission.  -s/p BMZ -. S/p initial mag and indocin for 48 hours. Stopped 9/3/2023  -s/p NICU consult  -Negative G/C, UA. Wet prep showed BV, now s/p 7 day course of flagyl   -Signed consent and reviewed risks if classical C section indicated for presentation or fetal distress.   - Restarted on vaginal progesterone after risk-benefit discussion  - Social work consulted, will continue to follow     # UTDA1, bilateral  -seen on US on 9/3/2023  -has had LR CFFDNA  -will continue to follow (at 28 weeks if still pregnant) and will let NICU know if delivers prior to then.     # PNC  -Obtained outside records from Dr. Tianna Ellis at [facility]   -LMP 3/23/2023, HORACIO 2023  -Pap UTD, no hx of abnormal   -msAFP low risk (0.97 MoM)   -HCV-, HIV-, HBV-, RPR-, Rubella immune  -9/3 A pos, Ab negative, Syphilis negative, GC/CT negative, Rubella immune, HBV-, HIV-  -VTE prophylaxis: Lovenox 40mg daily  -Delivery Method: Hx of CS x1 due to prolonged labor at 39w, desires TOLAC w/ current pregnancy    Dispo: Given stable cervical exam and no longer feeling contractions, stable for discharge home at this time. Reviewed return precautions to [place] vs [facility] (closer to home) if more emergent eval needed.    Patient discussed and staffed with Dr. Bambi Castillo.    Gisella Jensen MD  ObGyn Resident, PGY-4  2023    MFM Attending Attestation  I saw this patient with the resident and agree with the resident's findings and plan of care as documented in the resident's note. I personally reviewed her vital  "signs, medications, labs, pertinent imaging, and fetal monitoring. In summary, Ms. Pena is a 32yo  at 24w0d admitted with threatened  labor with an ultrasound indicated cerclage placed  now s/p cerclage removal with a stable cervical exam (1cm dilated) and restarted on vaginal progesterone. She received BMZ - s/p tocolysis for 48 hours. She has met with the NICU and desires full resuscitation. Fetus was cephalic on last ultrasound, she has a history of CS x 1. She has been consented for  including classical if indicated, but if cephalic would desire TOLAC. Both maternal and fetal status are stable and reassuring. Given her reassuring and stable clinical status we offered her repeat exam and discharge today. Repeat cervical exam was unchanged from admission (/high) and therefore it is reasonable for her to discharge.     Discussed that she can arrange follow-up with [facility], would recommend she be seen sometime next week for post-hospitalization follow-up. Additionally, we reviewed call/return precautions. She lives minutes from [facility] so discussed presenting there in an emergency with transfer as needed.     BP 99/62 (BP Location: Right arm, Patient Position: Semi-Stearns's, Cuff Size: Adult Regular)   Pulse 54   Temp 98.2  F (36.8  C) (Oral)   Resp 18   Ht 1.753 m (5' 9\")   Wt 71.2 kg (157 lb)   LMP 2023 (Approximate)   SpO2 100%   BMI 23.18 kg/m        FHT: Baseline 140s, moderate variability, +accels, no decels  TOCO: None  NST interpretation for today: reactive, reassuring and appropriate for GA    Time Spent on this Encounter   I spent a total of 60 minutes face-to-face or coordinating care of Ms. Pena. Over 50% of my time on the unit was spent counseling the patient and /or coordinating care regarding diagnosis, diagnostic results, prognosis and risks and benefits of treatment options.     Date of service (when I saw the patient): " 9/7/2023     MD JUDE Martel  , OB/GYN  Maternal-Fetal Medicine  [phone] (Pager)

## 2023-09-07 NOTE — PLAN OF CARE
Problem: Maternal-Fetal Wellbeing  Goal: Optimal Maternal-Fetal Wellbeing  Outcome: Progressing     Problem:  Labor  Goal: Delayed  Delivery  Outcome: Progressing  Intervention: Monitor and Manage  Labor  Recent Flowsheet Documentation  Taken 2023 by Allyssa Lr RN  Body Position: position changed independently  Taken 2023 by Allyssa Lr RN  Body Position: position changed independently   Goal Outcome Evaluation:    Vital signs WDL, afebrile. Reports good fetal movement. Denies leaking of fluid and vaginal bleeding. EFM tracing appropriate for gestational age - see Flowsheet. Denies having contractions.  Denies headache, visual disturbances and RUQ pain. Is up ad farrah and independent with self cares. Continue with current plan of care; may possibly discharge to home tomorrow if no further signs/symptoms of  labor.

## 2023-09-07 NOTE — PROGRESS NOTES
D: Discharge instructions reviewed with patient and  they understand warning signs and when to come back. Discharged to home undelivered.

## 2023-09-27 ENCOUNTER — HOSPITAL ENCOUNTER (OUTPATIENT)
Facility: CLINIC | Age: 33
Discharge: HOME | End: 2023-09-27
Attending: OBSTETRICS & GYNECOLOGY | Admitting: OBSTETRICS & GYNECOLOGY
Payer: COMMERCIAL

## 2023-09-27 ENCOUNTER — HOSPITAL ENCOUNTER (OUTPATIENT)
Facility: CLINIC | Age: 33
End: 2023-09-27
Admitting: OBSTETRICS & GYNECOLOGY
Payer: COMMERCIAL

## 2023-09-27 VITALS — TEMPERATURE: 97.8 F | RESPIRATION RATE: 16 BRPM | SYSTOLIC BLOOD PRESSURE: 118 MMHG | DIASTOLIC BLOOD PRESSURE: 68 MMHG

## 2023-09-27 PROBLEM — Z36.89 ENCOUNTER FOR TRIAGE IN PREGNANT PATIENT: Status: ACTIVE | Noted: 2023-09-27

## 2023-09-27 LAB
ALBUMIN UR-MCNC: NEGATIVE MG/DL
APPEARANCE UR: CLEAR
BILIRUB UR QL STRIP: NEGATIVE
CLUE CELLS: ABNORMAL
COLOR UR AUTO: NORMAL
CRYSTALS AMN MICRO: NORMAL
GLUCOSE UR STRIP-MCNC: NEGATIVE MG/DL
HGB UR QL STRIP: NEGATIVE
KETONES UR STRIP-MCNC: NEGATIVE MG/DL
LEUKOCYTE ESTERASE UR QL STRIP: NEGATIVE
NITRATE UR QL: NEGATIVE
PH UR STRIP: 6.5 [PH] (ref 5–7)
SP GR UR STRIP: 1.01 (ref 1–1.03)
TRICHOMONAS, WET PREP: ABNORMAL
UROBILINOGEN UR STRIP-MCNC: NORMAL MG/DL
WBC'S/HIGH POWER FIELD, WET PREP: ABNORMAL
YEAST, WET PREP: ABNORMAL

## 2023-09-27 PROCEDURE — 87491 CHLMYD TRACH DNA AMP PROBE: CPT

## 2023-09-27 PROCEDURE — 81003 URINALYSIS AUTO W/O SCOPE: CPT

## 2023-09-27 PROCEDURE — G0463 HOSPITAL OUTPT CLINIC VISIT: HCPCS

## 2023-09-27 PROCEDURE — 87210 SMEAR WET MOUNT SALINE/INK: CPT

## 2023-09-27 RX ORDER — LIDOCAINE 40 MG/G
CREAM TOPICAL
Status: DISCONTINUED | OUTPATIENT
Start: 2023-09-27 | End: 2023-09-27 | Stop reason: HOSPADM

## 2023-09-27 RX ORDER — VALACYCLOVIR HYDROCHLORIDE 500 MG/1
500 TABLET, FILM COATED ORAL DAILY
Status: ON HOLD | COMMUNITY
Start: 2023-01-02 | End: 2023-11-23

## 2023-09-27 ASSESSMENT — ACTIVITIES OF DAILY LIVING (ADL)
CHANGE_IN_FUNCTIONAL_STATUS_SINCE_ONSET_OF_CURRENT_ILLNESS/INJURY: NO
WEAR_GLASSES_OR_BLIND: NO
HEARING_DIFFICULTY_OR_DEAF: NO
DIFFICULTY_EATING/SWALLOWING: NO
CONCENTRATING,_REMEMBERING_OR_MAKING_DECISIONS_DIFFICULTY: NO
DRESSING/BATHING_DIFFICULTY: NO
WALKING_OR_CLIMBING_STAIRS_DIFFICULTY: NO
ADLS_ACUITY_SCORE: 35
FALL_HISTORY_WITHIN_LAST_SIX_MONTHS: NO
TOILETING_ISSUES: NO
DOING_ERRANDS_INDEPENDENTLY_DIFFICULTY: NO
DIFFICULTY_COMMUNICATING: NO
ADLS_ACUITY_SCORE: 18

## 2023-09-28 LAB
C TRACH DNA SPEC QL PROBE+SIG AMP: NEGATIVE
N GONORRHOEA DNA SPEC QL NAA+PROBE: NEGATIVE

## 2023-09-28 NOTE — DISCHARGE INSTRUCTIONS
Discharge Instruction for Undelivered Patients      You were seen for: Labor Assessment  We Consulted: VIK REECE's  You had (Test or Medicine): FHR monitoring and uterine monitoring     Labor and Delivery #: [id number].    Diet:   Drink 8 to 12 glasses of liquids (milk, juice, water) every day.  You may eat meals and snacks.     Activity:  Call your doctor or nurse midwife if your baby is moving less than usual.     Call your provider if you notice:  Swelling in your face or increased swelling in your hands or legs.  Headaches that are not relieved by Tylenol (acetaminophen).  Changes in your vision (blurring: seeing spots or stars.)  Nausea (sick to your stomach) and vomiting (throwing up).   Weight gain of 5 pounds or more per week.  Heartburn that doesn't go away.  Signs of bladder infection: pain when you urinate (use the toilet), need to go more often and more urgently.  The bag of kaufman (rupture of membranes) breaks, or you notice leaking in your underwear.  Bright red blood in your underwear.  Abdominal (lower belly) or stomach pain.  For first baby: Contractions (tightening) less than 5 minutes apart for one hour or more.  Second (plus) baby: Contractions (tightening) less than 10 minutes apart and getting stronger.  *If less than 34 weeks: Contractions (tightening) more than 6 times in one hour.  Increase or change in vaginal discharge (note the color and amount)    Follow-up:  As scheduled in the clinic

## 2023-09-28 NOTE — PROGRESS NOTES
L&D Triage Progress Note     HPI: Dina Pena is a 33 year old  at 26w6d by LMP c/w 7w0d US, here for abdominal cramping and back pain.    Noticed some increased discharge today. Had to change her underwear once after standing. States it can be confusing if this is due to the vaginal progesterone or her water breaking. States she has been fooled by this before. More concerned about the back pain and abdominal cramping. States the last time this happened she had BV. Previously admitted for PTL earlier this month. Had cerclage removed at that time. States her cervix was 1/50 on discharge. Received BMZ course during that admission.     Pregnancy notable for:  - H/o LTCS  - S/p Landin cerclage  - L4-5 lumbar fusion    She states that she is otherwise feeling well today.  + FM, no VB.  She denies fever, HA, scotoma, CP, SOB, nausea, vomiting,  RUQ pain, constipation, diarrhea, dysuria, and acute swelling.    Lab Results   Component Value Date    AS Negative 2023    HEPBANG Nonreactive 2023    HGB 12.8 2023       GBS Status:   No results found for: GBS          OBHX:  OB History    Para Term  AB Living   2 1 1 0 0 1   SAB IAB Ectopic Multiple Live Births   0 0 0 0 1      # Outcome Date GA Lbr Uli/2nd Weight Sex Delivery Anes PTL Lv   2 Current            1 Term 22 39w6d    CS-LTranv   KENNEDY       MedicalHX:  Past Medical History:   Diagnosis Date    Acquired spondylolisthesis     acquired lumbar    L4-L5 disc bulge     Spine fracture 10/2011       SurgicalHX:  Past Surgical History:   Procedure Laterality Date    CERCLAGE CERVICAL N/A 2023    Procedure: Cerclage cervical;  Surgeon: Radha Vuong MD;  [facility]    FUSION SPINE POSTERIOR ONE LEVEL  2011    L5-S1 fracture    OPEN REDUCTION INTERNAL FIXATION WRIST Left 2016    Procedure: OPEN REDUCTION INTERNAL FIXATION WRIST;  Surgeon: Isra Hu MD;  Location: [facility]    THROAT SURGERY   2009    For abscess       Medications:  No current facility-administered medications on file prior to encounter.  aspirin 81 MG EC tablet, Take 81 mg by mouth daily  Prenatal Vit-Fe Fumarate-FA (PRENATAL MULTIVITAMIN  PLUS IRON) 27-1 MG TABS, Take by mouth daily  PROGESTERONE 200 MG CAPS COMPOUND (PROGESTERONE 200 MG CAPS COMPOUND), 1 capsule At Bedtime Takes vaginal suppository  valACYclovir (VALTREX) 500 MG tablet, Take 500 mg by mouth daily At bedtime  hydrOXYzine (ATARAX) 25 MG tablet, Take 1 tablet (25 mg) by mouth every 4 hours as needed (muscle spasms, nausea)  oxyCODONE-acetaminophen (PERCOCET) 5-325 MG per tablet, Take 1-2 tablets by mouth every 4 hours as needed for pain (moderate to severe)  senna-docusate (SENOKOT-S;PERICOLACE) 8.6-50 MG per tablet, Take 1-2 tablets by mouth daily as needed for constipation        Allergies:  Allergies   Allergen Reactions    Blood Transfusion Related (Informational Only) Other (See Comments)     Patient has a history of a clinically significant antibody against RBC antigens.  A delay in compatible RBCs may occur.    Nka [No Known Allergies]        FamilyHX:      Family History   Problem Relation Age of Onset    Heart Disease Paternal Grandmother         Heart disease    Heart Disease Paternal Grandfather         Heart disease    Heart Disease Paternal Grandmother     Heart Disease Paternal Grandfather        SocialHX:  Social History     Socioeconomic History    Marital status:      Spouse name: None    Number of children: None    Years of education: None    Highest education level: None   Occupational History    Occupation: PCA     Employer: STUDENT     Comment: [employer]   Tobacco Use    Smoking status: Never    Smokeless tobacco: Never   Substance and Sexual Activity    Alcohol use: No    Drug use: Never    Sexual activity: Yes     Partners: Male       ROS: 10-point ROS negative except as in HPI.    Physical Exam:  Vitals:    09/27/23 1810   BP:  118/68   BP Location: Right arm   Patient Position: Semi-Stearns's   Resp: 16   Temp: 97.8  F (36.6  C)   TempSrc: Oral     GEN: resting comfortably in bed, NAD   CV: Regular rate, well perfused  PULM: On room air, no increased work of breathing  ABD: soft, gravid, non-tender, non-distended  EXT: No edema    SSE: Normal appearing external female genitalia. Thick white discharge throughout the vaginal vault. Cervix appears closed. No pooling. Swabs collected.  SVE: /-3, unchanged.    NST:  FHT: baseline 135, mod variability, + accels, - decels  TOCO: irritability    A/P: 33 year old  at 26w6d by LMP c/w 7w0d US, here for for abdominal cramping, back pain and increased vaginal discharge. Pregnancy notable for h/o LTCS, cervical shortening s/p Landin cerclage, and L4-5 lumbar fusion.     # R/o PTL  # R/o PPROM  - Cervix: Unchanged from prior admission.  - Membranes: Intact given above exam and reassuring studies.  - Labs: UA reflex UC, Wet Prep negative. GC/CT pending.  - Not in active labor. Abdominal cramping improved with time and oral hydration. Continue symptomatic management at home with heat packs and tylenol PRN. Encouraged hydration.     # Fetal Well-Being  - NST appropriate for GA    # PNC  - Rh positive, GBS positive    Dispo: Discharged to home with strict return precautions and scheduled prenatal follow up.    Patient discussed with Dr. Paz.    Zack Lynn MD, MPH  Ob/Gyn Resident, PGY-3  23 9:16 PM     Staff MD Note    I appreciate the note by Dr. Lynn.  Any necessary changes have been made by me.  I discussed the patient with the resident and agree with the findings and plan of care as documented in the note.    Denia Paz MD

## 2023-09-28 NOTE — PLAN OF CARE
Data:  Reason for maternal/fetal assessment per patient is Rule out rupture of membranes and Rule Out Labor (Reports contractions and back pain starting at 1600 today)    Patient is a . Prenatal record reviewed.     Gestational Age 26w6d VSS. Cervix: unchanged. Fetal movement present. Patient denies vaginal discharge, pelvic pressure, UTI symptoms, GI problems, bloody show, vaginal bleeding, edema, headache, visual disturbances, epigastric or URQ pain, abdominal pain, rupture of membranes.  Action: Verbal consent for EFM. Triage assessment completed. Uterine assessment high frequency low amplitude, pt reports feeling occasional cramping but it has gotten better and less frequent since being here. Fetal assessment: Presumed adequate fetal oxygenation documented (see flow record). Patient education given and reasons to come back in. Patient instructed to report change in fetal movement, vaginal leaking of fluid or bleeding, abdominal pain, or any concerns related to the pregnancy to her nurse/physician.     Pt discharged to home at .

## 2023-10-02 ENCOUNTER — TELEPHONE (OUTPATIENT)
Dept: MATERNAL FETAL MEDICINE | Facility: CLINIC | Age: 33
End: 2023-10-02
Payer: COMMERCIAL

## 2023-10-02 ENCOUNTER — HOSPITAL ENCOUNTER (INPATIENT)
Facility: CLINIC | Age: 33
LOS: 1 days | Discharge: HOME | DRG: 833 | End: 2023-10-03
Attending: OBSTETRICS & GYNECOLOGY | Admitting: STUDENT IN AN ORGANIZED HEALTH CARE EDUCATION/TRAINING PROGRAM
Payer: COMMERCIAL

## 2023-10-02 PROBLEM — O34.30 PREMATURE CERVICAL DILATION: Status: ACTIVE | Noted: 2023-10-02

## 2023-10-02 LAB
ABO/RH(D): NORMAL
ANTIBODY SCREEN: NEGATIVE
BASO+EOS+MONOS # BLD AUTO: ABNORMAL 10*3/UL
BASO+EOS+MONOS NFR BLD AUTO: ABNORMAL %
BASOPHILS # BLD AUTO: 0 10E3/UL (ref 0–0.2)
BASOPHILS NFR BLD AUTO: 0 %
CLUE CELLS: ABNORMAL
CRYSTALS AMN MICRO: NORMAL
EOSINOPHIL # BLD AUTO: 0.1 10E3/UL (ref 0–0.7)
EOSINOPHIL NFR BLD AUTO: 2 %
ERYTHROCYTE [DISTWIDTH] IN BLOOD BY AUTOMATED COUNT: 13.3 % (ref 10–15)
HCT VFR BLD AUTO: 34.5 % (ref 35–47)
HGB BLD-MCNC: 12.1 G/DL (ref 11.7–15.7)
IMM GRANULOCYTES # BLD: 0 10E3/UL
IMM GRANULOCYTES NFR BLD: 1 %
LYMPHOCYTES # BLD AUTO: 2.9 10E3/UL (ref 0.8–5.3)
LYMPHOCYTES NFR BLD AUTO: 38 %
MCH RBC QN AUTO: 31.6 PG (ref 26.5–33)
MCHC RBC AUTO-ENTMCNC: 35.1 G/DL (ref 31.5–36.5)
MCV RBC AUTO: 90 FL (ref 78–100)
MONOCYTES # BLD AUTO: 0.5 10E3/UL (ref 0–1.3)
MONOCYTES NFR BLD AUTO: 7 %
NEUTROPHILS # BLD AUTO: 4.1 10E3/UL (ref 1.6–8.3)
NEUTROPHILS NFR BLD AUTO: 52 %
NRBC # BLD AUTO: 0 10E3/UL
NRBC BLD AUTO-RTO: 0 /100
PLATELET # BLD AUTO: 192 10E3/UL (ref 150–450)
RBC # BLD AUTO: 3.83 10E6/UL (ref 3.8–5.2)
RUPTURE OF FETAL MEMBRANES BY ROM PLUS: NEGATIVE
SPECIMEN EXPIRATION DATE: NORMAL
TRICHOMONAS, WET PREP: ABNORMAL
WBC # BLD AUTO: 7.6 10E3/UL (ref 4–11)
WBC'S/HIGH POWER FIELD, WET PREP: ABNORMAL
YEAST, WET PREP: ABNORMAL

## 2023-10-02 PROCEDURE — 84112 EVAL AMNIOTIC FLUID PROTEIN: CPT

## 2023-10-02 PROCEDURE — 120N000002 HC R&B MED SURG/OB UMMC

## 2023-10-02 PROCEDURE — 99222 1ST HOSP IP/OBS MODERATE 55: CPT | Mod: 25 | Performed by: STUDENT IN AN ORGANIZED HEALTH CARE EDUCATION/TRAINING PROGRAM

## 2023-10-02 PROCEDURE — 85025 COMPLETE CBC W/AUTO DIFF WBC: CPT

## 2023-10-02 PROCEDURE — G0463 HOSPITAL OUTPT CLINIC VISIT: HCPCS

## 2023-10-02 PROCEDURE — 76815 OB US LIMITED FETUS(S): CPT | Mod: 26 | Performed by: STUDENT IN AN ORGANIZED HEALTH CARE EDUCATION/TRAINING PROGRAM

## 2023-10-02 PROCEDURE — 86900 BLOOD TYPING SEROLOGIC ABO: CPT

## 2023-10-02 PROCEDURE — 87210 SMEAR WET MOUNT SALINE/INK: CPT

## 2023-10-02 PROCEDURE — 59025 FETAL NON-STRESS TEST: CPT | Mod: 26 | Performed by: STUDENT IN AN ORGANIZED HEALTH CARE EDUCATION/TRAINING PROGRAM

## 2023-10-02 RX ORDER — PROCHLORPERAZINE 25 MG
25 SUPPOSITORY, RECTAL RECTAL EVERY 12 HOURS PRN
Status: DISCONTINUED | OUTPATIENT
Start: 2023-10-02 | End: 2023-10-03 | Stop reason: HOSPADM

## 2023-10-02 RX ORDER — ACETAMINOPHEN 325 MG/1
650 TABLET ORAL EVERY 4 HOURS PRN
Status: DISCONTINUED | OUTPATIENT
Start: 2023-10-02 | End: 2023-10-03 | Stop reason: HOSPADM

## 2023-10-02 RX ORDER — ASPIRIN 81 MG/1
81 TABLET ORAL DAILY
Status: DISCONTINUED | OUTPATIENT
Start: 2023-10-03 | End: 2023-10-03 | Stop reason: HOSPADM

## 2023-10-02 RX ORDER — AMOXICILLIN 250 MG
1-2 CAPSULE ORAL DAILY PRN
Status: DISCONTINUED | OUTPATIENT
Start: 2023-10-02 | End: 2023-10-03 | Stop reason: HOSPADM

## 2023-10-02 RX ORDER — BISACODYL 10 MG
10 SUPPOSITORY, RECTAL RECTAL DAILY PRN
Status: DISCONTINUED | OUTPATIENT
Start: 2023-10-04 | End: 2023-10-03 | Stop reason: HOSPADM

## 2023-10-02 RX ORDER — ONDANSETRON 4 MG/1
4 TABLET, ORALLY DISINTEGRATING ORAL EVERY 6 HOURS PRN
Status: DISCONTINUED | OUTPATIENT
Start: 2023-10-02 | End: 2023-10-03 | Stop reason: HOSPADM

## 2023-10-02 RX ORDER — METOCLOPRAMIDE 10 MG/1
10 TABLET ORAL EVERY 6 HOURS PRN
Status: DISCONTINUED | OUTPATIENT
Start: 2023-10-02 | End: 2023-10-03 | Stop reason: HOSPADM

## 2023-10-02 RX ORDER — DIPHENHYDRAMINE HCL 25 MG
25 CAPSULE ORAL EVERY 6 HOURS PRN
Status: DISCONTINUED | OUTPATIENT
Start: 2023-10-02 | End: 2023-10-03 | Stop reason: HOSPADM

## 2023-10-02 RX ORDER — HYDROXYZINE HYDROCHLORIDE 25 MG/1
25 TABLET, FILM COATED ORAL EVERY 4 HOURS PRN
Status: DISCONTINUED | OUTPATIENT
Start: 2023-10-02 | End: 2023-10-03 | Stop reason: HOSPADM

## 2023-10-02 RX ORDER — DIPHENHYDRAMINE HYDROCHLORIDE 50 MG/ML
25 INJECTION INTRAMUSCULAR; INTRAVENOUS EVERY 6 HOURS PRN
Status: DISCONTINUED | OUTPATIENT
Start: 2023-10-02 | End: 2023-10-03 | Stop reason: HOSPADM

## 2023-10-02 RX ORDER — LIDOCAINE 40 MG/G
CREAM TOPICAL
Status: DISCONTINUED | OUTPATIENT
Start: 2023-10-02 | End: 2023-10-02

## 2023-10-02 RX ORDER — METOCLOPRAMIDE HYDROCHLORIDE 5 MG/ML
10 INJECTION INTRAMUSCULAR; INTRAVENOUS EVERY 6 HOURS PRN
Status: DISCONTINUED | OUTPATIENT
Start: 2023-10-02 | End: 2023-10-03 | Stop reason: HOSPADM

## 2023-10-02 RX ORDER — ONDANSETRON 2 MG/ML
4 INJECTION INTRAMUSCULAR; INTRAVENOUS EVERY 6 HOURS PRN
Status: DISCONTINUED | OUTPATIENT
Start: 2023-10-02 | End: 2023-10-03 | Stop reason: HOSPADM

## 2023-10-02 RX ORDER — PROCHLORPERAZINE MALEATE 5 MG
10 TABLET ORAL EVERY 6 HOURS PRN
Status: DISCONTINUED | OUTPATIENT
Start: 2023-10-02 | End: 2023-10-03 | Stop reason: HOSPADM

## 2023-10-02 RX ORDER — VALACYCLOVIR HYDROCHLORIDE 500 MG/1
500 TABLET, FILM COATED ORAL AT BEDTIME
Status: DISCONTINUED | OUTPATIENT
Start: 2023-10-02 | End: 2023-10-03 | Stop reason: HOSPADM

## 2023-10-02 ASSESSMENT — ACTIVITIES OF DAILY LIVING (ADL)
ADLS_ACUITY_SCORE: 18
ADLS_ACUITY_SCORE: 18

## 2023-10-03 VITALS
DIASTOLIC BLOOD PRESSURE: 59 MMHG | TEMPERATURE: 97.7 F | HEIGHT: 69 IN | RESPIRATION RATE: 19 BRPM | BODY MASS INDEX: 24.16 KG/M2 | WEIGHT: 163.14 LBS | HEART RATE: 66 BPM | SYSTOLIC BLOOD PRESSURE: 102 MMHG

## 2023-10-03 PROCEDURE — 250N000013 HC RX MED GY IP 250 OP 250 PS 637

## 2023-10-03 RX ADMIN — ASPIRIN 81 MG: 81 TABLET ORAL at 07:27

## 2023-10-03 RX ADMIN — VALACYCLOVIR 500 MG: 500 TABLET, FILM COATED ORAL at 07:27

## 2023-10-03 ASSESSMENT — ACTIVITIES OF DAILY LIVING (ADL)
ADLS_ACUITY_SCORE: 18

## 2023-10-03 NOTE — PLAN OF CARE
Data: Patient assessed in the Birthplace for leaking vaginal fluid. Cervix 3 cm dilated and 60% effaced. Fetal station -3. Membranes intact. Contractions are not present. Uterine assessment is soft by palpation at rest. See flowsheets for fetal assessment documentation.     Action: Presumed adequate fetal oxygenation documented. Patient instructed to report change in fetal movement, vaginal leaking of fluid or bleeding, abdominal pain, or any concerns related to the pregnancy to the nurse    Response: Patient to be observed overnight due to cervical dilation status. Report given to BRANDI Grier at 2330. Patient to be moved to Antepartum for obs.

## 2023-10-03 NOTE — DISCHARGE INSTRUCTIONS
Warning Signs after Having a Baby    Keep this paper on your fridge or somewhere else where you can see it.    Call your provider if you have any of these symptoms up to 12 weeks after having your baby.    Thoughts of hurting yourself or your baby  Pain in your chest or trouble breathing  Severe headache not helped by pain medicine  Eyesight concerns (blurry vision, seeing spots or flashes of light, other changes to eyesight)  Fainting, shaking or other signs of a seizure    Call 9-1-1 if you feel that it is an emergency.     The symptoms below can happen to anyone after giving birth. They can be very serious. Call your provider if you have any of these warning signs.    My provider s phone number: _______________________    Losing too much blood (hemorrhage)    Call your provider if you soak through a pad in less than an hour or pass blood clots bigger than a golf ball. These may be signs that you are bleeding too much.    Blood clots in the legs or lungs    After you give birth, your body naturally clots its blood to help prevent blood loss. Sometimes this increased clotting can happen in other areas of the body, like the legs or lungs. This can block your blood flow and be very dangerous.     Call your provider if you:  Have a red, swollen spot on the back of your leg that is warm or painful when you touch it.   Are coughing up blood.     Infection    Call your provider if you have any of these symptoms:  Fever of 100.4 F (38 C) or higher.  Pain or redness around your stitches if you had an incision.   Any yellow, white, or green fluid coming from places where you had stitches or surgery.    Mood Problems (postpartum depression)    Many people feel sad or have mood changes after having a baby. But for some people, these mood swings are worse.     Call your provider right away if you feel so anxious or nervous that you can't care for yourself or your baby.    Preeclampsia (high blood pressure)    Even if you  didn't have high blood pressure when you were pregnant, you are at risk for the high blood pressure disease called preeclampsia. This risk can last up to 12 weeks after giving birth.     Call your provider if you have:   Pain on your right side under your rib cage  Sudden swelling in the hands and face    Remember: You know your body. If something doesn't feel right, get medical help.     For informational purposes only. Not to replace the advice of your health care provider. Copyright 2020 [facility]. All rights reserved. Clinically reviewed by Kera Chu, RNC-OB, MSN. [facility] [id number] - Rev 02/23.

## 2023-10-03 NOTE — H&P
OB H&P    HPI: Dina Pena is a 33 year old  at 27w4d by LMP c/w 7w0d US, here for LOF.    Noticed some increased discharge yesterday. No large gush, but ongoing vaginal discharge soaking her underwear multiple times. Continued but less today. No contractions or vaginal bleeding. Feeling better fetal movement now, previously decreased earlier today.    Previously admitted for PTL earlier this month. Had cerclage removed at that time. States her cervix was 1/50 on discharge. Received BMZ course during that admission. Recently seen in triage for similar complaints but discharged to home without any revealing findings. Infectious testing at that time negative. Cervix unchanged from prior admission.    Pregnancy notable for:  - H/o LTCS  - S/p Landin cerclage  - L4-5 lumbar fusion    She states that she is otherwise feeling well today. She denies fever, HA, scotoma, CP, SOB, nausea, vomiting,  RUQ pain, constipation, diarrhea, dysuria, and acute swelling.    Lab Results   Component Value Date    AS Negative 2023    HEPBANG Nonreactive 2023    HGB 12.8 2023       GBS Status:   No results found for: GBS          OBHX:  OB History    Para Term  AB Living   2 1 1 0 0 1   SAB IAB Ectopic Multiple Live Births   0 0 0 0 1      # Outcome Date GA Lbr Uli/2nd Weight Sex Delivery Anes PTL Lv   2 Current            1 Term 22 39w6d    CS-LTranv   KENNEDY       MedicalHX:  Past Medical History:   Diagnosis Date     Acquired spondylolisthesis     acquired lumbar     L4-L5 disc bulge      Spine fracture 10/2011       SurgicalHX:  Past Surgical History:   Procedure Laterality Date     CERCLAGE CERVICAL N/A 2023    Procedure: Cerclage cervical;  Surgeon: Radha Vuong MD;  [facility]     FUSION SPINE POSTERIOR ONE LEVEL  2011    L5-S1 fracture     OPEN REDUCTION INTERNAL FIXATION WRIST Left 2016    Procedure: OPEN REDUCTION INTERNAL FIXATION WRIST;  Surgeon:  Isra Hu MD;  Location: [facility]     THROAT SURGERY  2009    For abscess       Medications:  No current facility-administered medications on file prior to encounter.  aspirin 81 MG EC tablet, Take 81 mg by mouth daily  hydrOXYzine (ATARAX) 25 MG tablet, Take 1 tablet (25 mg) by mouth every 4 hours as needed (muscle spasms, nausea)  oxyCODONE-acetaminophen (PERCOCET) 5-325 MG per tablet, Take 1-2 tablets by mouth every 4 hours as needed for pain (moderate to severe)  Prenatal Vit-Fe Fumarate-FA (PRENATAL MULTIVITAMIN  PLUS IRON) 27-1 MG TABS, Take by mouth daily  PROGESTERONE 200 MG CAPS COMPOUND (PROGESTERONE 200 MG CAPS COMPOUND), 1 capsule At Bedtime Takes vaginal suppository  senna-docusate (SENOKOT-S;PERICOLACE) 8.6-50 MG per tablet, Take 1-2 tablets by mouth daily as needed for constipation  valACYclovir (VALTREX) 500 MG tablet, Take 500 mg by mouth daily At bedtime        Allergies:  Allergies   Allergen Reactions     Blood Transfusion Related (Informational Only) Other (See Comments)     Patient has a history of a clinically significant antibody against RBC antigens.  A delay in compatible RBCs may occur.     Nka [No Known Allergies]        FamilyHX:  Family History   Problem Relation Age of Onset     Heart Disease Paternal Grandmother         Heart disease     Heart Disease Paternal Grandfather         Heart disease     Heart Disease Paternal Grandmother      Heart Disease Paternal Grandfather        SocialHX:  Social History     Socioeconomic History     Marital status:      Spouse name: None     Number of children: None     Years of education: None     Highest education level: None   Occupational History     Occupation: PCA     Employer: [employer]     Comment: [employer]   Tobacco Use     Smoking status: Never     Smokeless tobacco: Never   Substance and Sexual Activity     Alcohol use: No     Drug use: Never     Sexual activity: Yes     Partners: Male       ROS: 10-point ROS  "negative except as in HPI.    Physical Exam:  Vitals:    10/02/23 2040 10/02/23 2052   BP: 101/59    BP Location: Left arm    Patient Position: Semi-Stearns's    Cuff Size: Adult Regular    Pulse: 66    Resp: 22    Temp: 98.4  F (36.9  C)    TempSrc: Oral    Weight:  74.8 kg (165 lb)   Height:  1.753 m (5' 9\")     GEN: resting comfortably in bed, NAD   CV: Regular rate, well perfused  PULM: On room air, no increased work of breathing  ABD: soft, gravid, non-tender, non-distended  EXT: No edema    SSE: Normal appearing external female genitalia, no lesions. Thick white discharge throughout the vaginal vault cleared with large swabs. Cervix appears visually dilated compared to last examination. No pooling, negative Valsalva. Swabs collected. Given amount of suspected progesterone in the vaginal vault, SSE repeated after pooling position x1 hour. Again, no pooling and negative Valsalva.  SVE: 3/60/-3, posterior, soft. Changed from prior triage visit last week.    BSUS: Cephalic presentation. Subjectively normal fluid. Fetal movement present.    NST:  FHT: baseline 130, mod variability, + accels, - decels  TOCO: irritability    A/P: 33 year old  at 27w4d by LMP c/w 7w0d US, here for increased vaginal discharge. Pregnancy notable for h/o LTCS, cervical shortening s/p Landin cerclage, and L4-5 lumbar fusion.     # R/o PTL  # R/o PPROM  - Cervix: Changed from prior admission without contractions.   - Membranes: Intact given above exam and reassuring studies.  - Labs: Wet Prep negative. UA and GC/CT previously negative. Ferning and RomPlus negative.  - Given painless cervical dilation, recommend observation overnight for monitoring and recheck SVE in the morning. If continued dilation at that time, consider MFM consultation for rescue BMZ and further inpatient management. If unchanged, suspect appropriate for discharge to home.    # H/o CS x1  Desires TOLAC. Further discussion in house PRN.    # Fetal Well-Being  - " NST appropriate for GA  - TID and PRN monitoring    # PNC  - Rh positive  - GBS positive, PCN in labor    Patient discussed with Dr. Mera.    Zack Lynn MD, MPH  Ob/Gyn Resident, PGY-3  10/02/23 11:05 PM     I have seen and examined the patientt. I have reviewed and agree with the note.   Mary Mera MD

## 2023-10-03 NOTE — TELEPHONE ENCOUNTER
Dina called with concerns of intermittent leakage of a small amount of fluid throughout the day. She is wearing a panty liner and has had to change it several times. The fluid is clear and odorless. She also notes less movement today than what she typically feels.    Recommend evaluation in Birthplace to rule out PPROM and NST at this time. If discharge, Dina will follow up with [facility] tomorrow as scheduled for her scheduled prenatal visit.    Diamond Stone MD  Specialist in Maternal-Fetal Medicine

## 2023-10-03 NOTE — PLAN OF CARE
Pt denies having any vaginal discharge or leaking of fluids.  Denies having any pain as well. Slept throughout the night and appeared comfortable.  Was encouraged to call at the beginning of the shift with needs, concerns or changes.

## 2023-10-03 NOTE — PROVIDER NOTIFICATION
10/02/23 2103   Provider Notification   Provider Name/Title Dr Ha, G2   Method of Notification Electronic Page   Request Evaluate in Person   Notification Reason Patient Arrived     448-2. Pt arrived. 27.4 . Rule out rupture. strip reactive. ready for eval.

## 2023-10-03 NOTE — DISCHARGE SUMMARY
[facility] Discharge Summary    Dina Pena MRN# [id number]   Age: 33 year old YOB: 1990     Date of Admission:  10/2/2023  Date of Discharge::  10/03/2023  Admitting Physician:  Mary Mera MD  Discharge Physician:              Admission Diagnoses:     IUP at 27w5d   cervical dilation  Vaginal discharge  H/o LTCS  Short cervix s/p Landin cerclage removal for PTL in prior admission  L4-5 lumbar fusion          Discharge Diagnosis:   Same as above  Stable cervical exam         Procedures:     Procedure(s): None            Medications Prior to Admission:     No medications prior to admission.             Discharge Medications:        Review of your medicines        CONTINUE these medicines which have NOT CHANGED        Dose / Directions   aspirin 81 MG EC tablet      Dose: 81 mg  Take 81 mg by mouth daily  Refills: 0     hydrOXYzine 25 MG tablet  Commonly known as: ATARAX  Used for: Other closed extra-articular fracture of distal end of left radius with malunion      Dose: 25 mg  Take 1 tablet (25 mg) by mouth every 4 hours as needed (muscle spasms, nausea)  Quantity: 60 tablet  Refills: 0     oxyCODONE-acetaminophen 5-325 MG tablet  Commonly known as: PERCOCET  Used for: Other closed extra-articular fracture of distal end of left radius with malunion      Dose: 1-2 tablet  Take 1-2 tablets by mouth every 4 hours as needed for pain (moderate to severe)  Quantity: 80 tablet  Refills: 0     prenatal multivitamin  plus iron 27-1 MG Tabs      Take by mouth daily  Refills: 0     PROGESTERONE 200 MG CAPS COMPOUND  Commonly known as: PROGESTERONE 200 MG CAPS COMPOUND      Dose: 1 capsule  1 capsule At Bedtime Takes vaginal suppository  Refills: 0     senna-docusate 8.6-50 MG tablet  Commonly known as: SENOKOT-S/PERICOLACE  Used for: Other closed extra-articular fracture of distal end of left radius with malunion      Dose: 1-2 tablet  Take 1-2 tablets by mouth daily as needed  for constipation  Quantity: 15 tablet  Refills: 0     valACYclovir 500 MG tablet  Commonly known as: VALTREX      Dose: 500 mg  Take 500 mg by mouth daily At bedtime  Refills: 0                    Consultations:   None          Brief History of Admission and Antepartum Course:   33 year old  at 27w4d by LMP c/w 7w0d US, here for increased vaginal discharge. Pregnancy notable for h/o LTCS, cervical shortening s/p Landin cerclage, and L4-5 lumbar fusion. Examination and laboratory workup reassuring for intact membranes. However, cervix visibly dilated compared to last examination on . Therfore, SVE performed and notable for increased dilation from prior visit on . Given painless cervical dilation, recommend observation overnight for monitoring and recheck SVE in the morning. If continued dilation at that time, consider MFM consultation for rescue BMZ and further inpatient management. If unchanged, suspect appropriate for discharge to home.      On the day of discharge, her cervix remained unchanged. Given no evidence of PTL at this time, recommend discharge to home with close outpatient follow up.        Discharge Instructions and Follow-Up:     Discharge Instructions:  Call or present to labor and delivery if you experience:   -Regular painful contractions concerning for labor   -Leakage of fluid concerning for ruptured membranes   -Decreased fetal movement   -Bright red vaginal bleeding    -Headache, vision changes, upper abdominal pain, significant increase in swelling,   generalized unwell feeling    Follow up with primary Ob/Gyn as scheduled.         Discharge Disposition:     Discharged to home      Zack Lynn MD, MPH  Ob/Gyn Resident, PGY-3

## 2023-10-03 NOTE — PLAN OF CARE
Received report from BRANDI Ortiz and assumed care of pt.  Pt will be brought back to Ante room 425.  POC discussed, questions answered.  Pt oriented to room.

## 2023-10-03 NOTE — PROVIDER NOTIFICATION
10/02/23 2146   Provider Notification   Provider Name/Title Dr. Ha   Method of Notification Electronic Page   Request Evaluate in Person   Notification Reason Patient Arrived;Other (Comment)  (second page)     second page. patient here for RO rupture. 27.4. reactive strip. ready for eval

## 2023-10-03 NOTE — PLAN OF CARE
Pt discharged to home undelivered in stable condition.  Discharge instructions given, pt verbalized understanding.  IV removed without difficulty.  Pt OK to continue same medications. Escorted to 4th floor elevators.

## 2023-10-05 ENCOUNTER — ANCILLARY PROCEDURE (OUTPATIENT)
Dept: ULTRASOUND IMAGING | Facility: HOSPITAL | Age: 33
End: 2023-10-05
Attending: STUDENT IN AN ORGANIZED HEALTH CARE EDUCATION/TRAINING PROGRAM
Payer: COMMERCIAL

## 2023-10-05 ENCOUNTER — OFFICE VISIT (OUTPATIENT)
Dept: MATERNAL FETAL MEDICINE | Facility: HOSPITAL | Age: 33
End: 2023-10-05
Attending: STUDENT IN AN ORGANIZED HEALTH CARE EDUCATION/TRAINING PROGRAM
Payer: COMMERCIAL

## 2023-10-05 ENCOUNTER — HOSPITAL ENCOUNTER (OUTPATIENT)
Facility: HOSPITAL | Age: 33
Discharge: HOME | End: 2023-10-05
Attending: FAMILY MEDICINE | Admitting: FAMILY MEDICINE
Payer: COMMERCIAL

## 2023-10-05 VITALS — DIASTOLIC BLOOD PRESSURE: 69 MMHG | RESPIRATION RATE: 18 BRPM | TEMPERATURE: 97.1 F | SYSTOLIC BLOOD PRESSURE: 113 MMHG

## 2023-10-05 DIAGNOSIS — O26.879 SHORT CERVIX AFFECTING PREGNANCY: Primary | ICD-10-CM

## 2023-10-05 DIAGNOSIS — O35.EXX0 PYELECTASIS OF FETUS ON PRENATAL ULTRASOUND: ICD-10-CM

## 2023-10-05 DIAGNOSIS — O26.90 PREGNANCY RELATED CONDITION, ANTEPARTUM: ICD-10-CM

## 2023-10-05 LAB
ALBUMIN UR-MCNC: NEGATIVE MG/DL
APPEARANCE UR: CLEAR
BILIRUB UR QL STRIP: NEGATIVE
CLUE CELLS: ABNORMAL
COLOR UR AUTO: COLORLESS
GLUCOSE UR STRIP-MCNC: NEGATIVE MG/DL
HGB UR QL STRIP: NEGATIVE
KETONES UR STRIP-MCNC: NEGATIVE MG/DL
LEUKOCYTE ESTERASE UR QL STRIP: NEGATIVE
NITRATE UR QL: NEGATIVE
PH UR STRIP: 7.5 [PH] (ref 5–7)
RBC URINE: <1 /HPF
SP GR UR STRIP: 1.01 (ref 1–1.03)
SQUAMOUS EPITHELIAL: <1 /HPF
TRICHOMONAS, WET PREP: ABNORMAL
UROBILINOGEN UR STRIP-MCNC: <2 MG/DL
WBC URINE: 0 /HPF
WBC'S/HIGH POWER FIELD, WET PREP: ABNORMAL
YEAST, WET PREP: ABNORMAL

## 2023-10-05 PROCEDURE — 76816 OB US FOLLOW-UP PER FETUS: CPT | Mod: 26 | Performed by: STUDENT IN AN ORGANIZED HEALTH CARE EDUCATION/TRAINING PROGRAM

## 2023-10-05 PROCEDURE — G0463 HOSPITAL OUTPT CLINIC VISIT: HCPCS | Mod: 27

## 2023-10-05 PROCEDURE — 76816 OB US FOLLOW-UP PER FETUS: CPT

## 2023-10-05 PROCEDURE — 99213 OFFICE O/P EST LOW 20 MIN: CPT | Mod: 25 | Performed by: STUDENT IN AN ORGANIZED HEALTH CARE EDUCATION/TRAINING PROGRAM

## 2023-10-05 PROCEDURE — 81001 URINALYSIS AUTO W/SCOPE: CPT | Performed by: FAMILY MEDICINE

## 2023-10-05 PROCEDURE — 87210 SMEAR WET MOUNT SALINE/INK: CPT | Performed by: FAMILY MEDICINE

## 2023-10-05 RX ORDER — LIDOCAINE 40 MG/G
CREAM TOPICAL
Status: DISCONTINUED | OUTPATIENT
Start: 2023-10-05 | End: 2023-10-05 | Stop reason: HOSPADM

## 2023-10-05 ASSESSMENT — ACTIVITIES OF DAILY LIVING (ADL)
ADLS_ACUITY_SCORE: 35
ADLS_ACUITY_SCORE: 35

## 2023-10-05 NOTE — PROGRESS NOTES
Caleb REECE notified client here. Client on EFM and toco. Joseline requested wet prep and UA and also to check clients cervix.

## 2023-10-05 NOTE — PROGRESS NOTES
I have had review with the nurses taking care of patient.  Reviewed her history and reviewed her strip.  Intermittent rounds of uterine activity.  I have talked to the nurses twice ;  wet prep and urine reassuring  Patient's not feeling much for contractions anymore however I do see irritability and some contractions intermittently.  I recommend another hour on the monitor.  I want to monitor how she is feeling.  She has had a previous betamethasone course however she would qualify for a repeat course as well as magnesium for neuro protection if there is any progression or she feels as these contractions are stronger and/or documented  labor with cervical change  Carlee Gauthier MD

## 2023-10-05 NOTE — NURSING NOTE
Patient reports  fetal movement, reports irregular contractions that have been coming and going. Last SVE inpatient was 3cm. Denies leaking of fluid, or bleeding.   Patient denies headache, visual changes, nausea/vomiting, epigastric pain related to preeclampsia.  SBAR given to DAVID REECE, see their note in Epic.

## 2023-10-05 NOTE — DISCHARGE INSTRUCTIONS
EARLY LABOR DISCHARGE INSTRUCTIONS    You were seen for: Labor Assessment  You had (Test or Medicine): Monitor baby, urine test and wet prep.    Refer to Any Day Now handout for tips on how to labor at home    WHEN TO CALL YOUR PROVIDER:  If this is your first baby: Your contractions (tightening) are 5 minutes apart, last more than 1 minute, and have been consistently getting stronger for 1 hour or more  If this is your second baby or beyond: Your contractions are less than 10 minutes apart and have been consistently getting stronger for 1 hour or more  Feeling your baby move less than usual  Temperature of 100.4 F (38 C) or higher  New fluid leaking from your vagina  Other signs your provider asked you to look for in your body  Vaginal bleeding (bright red blood)  Swelling in your face or more swelling in your hands or legs  Headaches that don't get better after taking Tylenol (acetaminophen)  Changes in your vision (blurry or seeing spots or stars)  Nausea (sick to your stomach) and vomiting (throwing up)  Heartburn that doesn't go away  Sudden, bad belly pain that is unlike your contractions    IF YOU ARRIVED WITH YOUR WATER ALREADY BROKEN (MEMBRANES RUPTURED):  Avoid placing anything in vagina, including intercourse (sex)  Check your temperature every 3 hours when awake  Call your provider/clinic if:  Your temperature is 100.4 F (38 C) or higher  Your fluid becomes not clear or is smelly  Your baby is moving less than usual  You don't go into labor within 24 hours of your water breaking  You have other concerns    FOLLOW UP:  As scheduled in the clinic

## 2023-11-09 ENCOUNTER — APPOINTMENT (OUTPATIENT)
Dept: ULTRASOUND IMAGING | Facility: HOSPITAL | Age: 33
End: 2023-11-09
Attending: FAMILY MEDICINE
Payer: COMMERCIAL

## 2023-11-09 ENCOUNTER — HOSPITAL ENCOUNTER (INPATIENT)
Facility: HOSPITAL | Age: 33
LOS: 14 days | Discharge: HOME | End: 2023-11-23
Attending: FAMILY MEDICINE | Admitting: FAMILY MEDICINE
Payer: COMMERCIAL

## 2023-11-09 DIAGNOSIS — Z86.19 HISTORY OF COLD SORES: ICD-10-CM

## 2023-11-09 PROBLEM — Z34.90 PREGNANT: Status: ACTIVE | Noted: 2023-11-09

## 2023-11-09 LAB
ABO/RH(D): NORMAL
ALBUMIN SERPL BCG-MCNC: 3.6 G/DL (ref 3.5–5.2)
ALBUMIN UR-MCNC: NEGATIVE MG/DL
ALP SERPL-CCNC: 77 U/L (ref 35–104)
ALT SERPL W P-5'-P-CCNC: 12 U/L (ref 0–50)
ANION GAP SERPL CALCULATED.3IONS-SCNC: 12 MMOL/L (ref 7–15)
ANTIBODY SCREEN: NEGATIVE
APPEARANCE UR: CLEAR
AST SERPL W P-5'-P-CCNC: 16 U/L (ref 0–45)
BILIRUB SERPL-MCNC: 0.6 MG/DL
BILIRUB UR QL STRIP: NEGATIVE
BUN SERPL-MCNC: 9.5 MG/DL (ref 6–20)
CALCIUM SERPL-MCNC: 9 MG/DL (ref 8.6–10)
CHLORIDE SERPL-SCNC: 104 MMOL/L (ref 98–107)
CLUE CELLS: ABNORMAL
COLOR UR AUTO: ABNORMAL
CREAT SERPL-MCNC: 0.47 MG/DL (ref 0.51–0.95)
DEPRECATED HCO3 PLAS-SCNC: 19 MMOL/L (ref 22–29)
EGFRCR SERPLBLD CKD-EPI 2021: >90 ML/MIN/1.73M2
ERYTHROCYTE [DISTWIDTH] IN BLOOD BY AUTOMATED COUNT: 13.5 % (ref 10–15)
GLUCOSE SERPL-MCNC: 90 MG/DL (ref 70–99)
GLUCOSE UR STRIP-MCNC: NEGATIVE MG/DL
HCT VFR BLD AUTO: 37.9 % (ref 35–47)
HGB BLD-MCNC: 13.1 G/DL (ref 11.7–15.7)
HGB UR QL STRIP: NEGATIVE
KETONES UR STRIP-MCNC: NEGATIVE MG/DL
LEUKOCYTE ESTERASE UR QL STRIP: NEGATIVE
MCH RBC QN AUTO: 31 PG (ref 26.5–33)
MCHC RBC AUTO-ENTMCNC: 34.6 G/DL (ref 31.5–36.5)
MCV RBC AUTO: 90 FL (ref 78–100)
MUCOUS THREADS #/AREA URNS LPF: PRESENT /LPF
NITRATE UR QL: NEGATIVE
PH UR STRIP: 7 [PH] (ref 5–7)
PLATELET # BLD AUTO: 206 10E3/UL (ref 150–450)
POTASSIUM SERPL-SCNC: 3.3 MMOL/L (ref 3.4–5.3)
PROT SERPL-MCNC: 6.7 G/DL (ref 6.4–8.3)
RBC # BLD AUTO: 4.22 10E6/UL (ref 3.8–5.2)
RBC URINE: 0 /HPF
SODIUM SERPL-SCNC: 135 MMOL/L (ref 135–145)
SP GR UR STRIP: 1.02 (ref 1–1.03)
SPECIMEN EXPIRATION DATE: NORMAL
SQUAMOUS EPITHELIAL: 1 /HPF
TRICHOMONAS, WET PREP: ABNORMAL
UROBILINOGEN UR STRIP-MCNC: <2 MG/DL
WBC # BLD AUTO: 12.5 10E3/UL (ref 4–11)
WBC URINE: <1 /HPF
WBC'S/HIGH POWER FIELD, WET PREP: ABNORMAL
YEAST, WET PREP: PRESENT

## 2023-11-09 PROCEDURE — 86850 RBC ANTIBODY SCREEN: CPT | Performed by: FAMILY MEDICINE

## 2023-11-09 PROCEDURE — 76819 FETAL BIOPHYS PROFIL W/O NST: CPT

## 2023-11-09 PROCEDURE — 86922 COMPATIBILITY TEST ANTIGLOB: CPT

## 2023-11-09 PROCEDURE — 258N000003 HC RX IP 258 OP 636: Performed by: FAMILY MEDICINE

## 2023-11-09 PROCEDURE — 85027 COMPLETE CBC AUTOMATED: CPT | Performed by: FAMILY MEDICINE

## 2023-11-09 PROCEDURE — 36415 COLL VENOUS BLD VENIPUNCTURE: CPT | Performed by: FAMILY MEDICINE

## 2023-11-09 PROCEDURE — 250N000011 HC RX IP 250 OP 636: Mod: JZ

## 2023-11-09 PROCEDURE — 87210 SMEAR WET MOUNT SALINE/INK: CPT | Performed by: FAMILY MEDICINE

## 2023-11-09 PROCEDURE — 80053 COMPREHEN METABOLIC PANEL: CPT | Performed by: FAMILY MEDICINE

## 2023-11-09 PROCEDURE — 81003 URINALYSIS AUTO W/O SCOPE: CPT | Performed by: FAMILY MEDICINE

## 2023-11-09 PROCEDURE — 96372 THER/PROPH/DIAG INJ SC/IM: CPT | Performed by: FAMILY MEDICINE

## 2023-11-09 PROCEDURE — 120N000001 HC R&B MED SURG/OB

## 2023-11-09 PROCEDURE — 99231 SBSQ HOSP IP/OBS SF/LOW 25: CPT | Performed by: NURSE PRACTITIONER

## 2023-11-09 PROCEDURE — 250N000011 HC RX IP 250 OP 636: Performed by: FAMILY MEDICINE

## 2023-11-09 PROCEDURE — 86901 BLOOD TYPING SEROLOGIC RH(D): CPT | Performed by: FAMILY MEDICINE

## 2023-11-09 PROCEDURE — 86780 TREPONEMA PALLIDUM: CPT | Performed by: FAMILY MEDICINE

## 2023-11-09 RX ORDER — NALOXONE HYDROCHLORIDE 0.4 MG/ML
0.2 INJECTION, SOLUTION INTRAMUSCULAR; INTRAVENOUS; SUBCUTANEOUS
Status: DISCONTINUED | OUTPATIENT
Start: 2023-11-09 | End: 2023-11-22 | Stop reason: HOSPADM

## 2023-11-09 RX ORDER — METOCLOPRAMIDE 10 MG/1
10 TABLET ORAL EVERY 6 HOURS PRN
Status: DISCONTINUED | OUTPATIENT
Start: 2023-11-09 | End: 2023-11-22 | Stop reason: HOSPADM

## 2023-11-09 RX ORDER — NALOXONE HYDROCHLORIDE 0.4 MG/ML
0.4 INJECTION, SOLUTION INTRAMUSCULAR; INTRAVENOUS; SUBCUTANEOUS
Status: DISCONTINUED | OUTPATIENT
Start: 2023-11-09 | End: 2023-11-22 | Stop reason: HOSPADM

## 2023-11-09 RX ORDER — SODIUM CHLORIDE, SODIUM LACTATE, POTASSIUM CHLORIDE, CALCIUM CHLORIDE 600; 310; 30; 20 MG/100ML; MG/100ML; MG/100ML; MG/100ML
INJECTION, SOLUTION INTRAVENOUS CONTINUOUS
Status: DISCONTINUED | OUTPATIENT
Start: 2023-11-09 | End: 2023-11-17

## 2023-11-09 RX ORDER — ACETAMINOPHEN 325 MG/1
650 TABLET ORAL EVERY 4 HOURS PRN
Status: DISCONTINUED | OUTPATIENT
Start: 2023-11-09 | End: 2023-11-22 | Stop reason: HOSPADM

## 2023-11-09 RX ORDER — METHYLERGONOVINE MALEATE 0.2 MG/ML
200 INJECTION INTRAVENOUS
Status: DISCONTINUED | OUTPATIENT
Start: 2023-11-09 | End: 2023-11-22 | Stop reason: HOSPADM

## 2023-11-09 RX ORDER — KETOROLAC TROMETHAMINE 30 MG/ML
30 INJECTION, SOLUTION INTRAMUSCULAR; INTRAVENOUS
Status: ACTIVE | OUTPATIENT
Start: 2023-11-09 | End: 2023-11-14

## 2023-11-09 RX ORDER — PROCHLORPERAZINE 25 MG
25 SUPPOSITORY, RECTAL RECTAL EVERY 12 HOURS PRN
Status: DISCONTINUED | OUTPATIENT
Start: 2023-11-09 | End: 2023-11-22 | Stop reason: HOSPADM

## 2023-11-09 RX ORDER — METOCLOPRAMIDE HYDROCHLORIDE 5 MG/ML
10 INJECTION INTRAMUSCULAR; INTRAVENOUS EVERY 6 HOURS PRN
Status: DISCONTINUED | OUTPATIENT
Start: 2023-11-09 | End: 2023-11-22 | Stop reason: HOSPADM

## 2023-11-09 RX ORDER — BETAMETHASONE SODIUM PHOSPHATE AND BETAMETHASONE ACETATE 3; 3 MG/ML; MG/ML
12 INJECTION, SUSPENSION INTRA-ARTICULAR; INTRALESIONAL; INTRAMUSCULAR; SOFT TISSUE ONCE
Status: COMPLETED | OUTPATIENT
Start: 2023-11-09 | End: 2023-11-09

## 2023-11-09 RX ORDER — OXYTOCIN 10 [USP'U]/ML
10 INJECTION, SOLUTION INTRAMUSCULAR; INTRAVENOUS
Status: DISCONTINUED | OUTPATIENT
Start: 2023-11-09 | End: 2023-11-23 | Stop reason: HOSPADM

## 2023-11-09 RX ORDER — PENICILLIN G 3000000 [IU]/50ML
3 INJECTION, SOLUTION INTRAVENOUS EVERY 4 HOURS
Status: DISCONTINUED | OUTPATIENT
Start: 2023-11-10 | End: 2023-11-10

## 2023-11-09 RX ORDER — LIDOCAINE 40 MG/G
CREAM TOPICAL
Status: DISCONTINUED | OUTPATIENT
Start: 2023-11-09 | End: 2023-11-09 | Stop reason: HOSPADM

## 2023-11-09 RX ORDER — MISOPROSTOL 200 UG/1
400 TABLET ORAL
Status: DISCONTINUED | OUTPATIENT
Start: 2023-11-09 | End: 2023-11-22 | Stop reason: HOSPADM

## 2023-11-09 RX ORDER — CARBOPROST TROMETHAMINE 250 UG/ML
250 INJECTION, SOLUTION INTRAMUSCULAR
Status: DISCONTINUED | OUTPATIENT
Start: 2023-11-09 | End: 2023-11-22 | Stop reason: HOSPADM

## 2023-11-09 RX ORDER — OXYTOCIN/0.9 % SODIUM CHLORIDE 30/500 ML
100-340 PLASTIC BAG, INJECTION (ML) INTRAVENOUS CONTINUOUS PRN
Status: DISCONTINUED | OUTPATIENT
Start: 2023-11-09 | End: 2023-11-23 | Stop reason: HOSPADM

## 2023-11-09 RX ORDER — ONDANSETRON 4 MG/1
4 TABLET, ORALLY DISINTEGRATING ORAL EVERY 6 HOURS PRN
Status: DISCONTINUED | OUTPATIENT
Start: 2023-11-09 | End: 2023-11-22 | Stop reason: HOSPADM

## 2023-11-09 RX ORDER — PROCHLORPERAZINE MALEATE 10 MG
10 TABLET ORAL EVERY 6 HOURS PRN
Status: DISCONTINUED | OUTPATIENT
Start: 2023-11-09 | End: 2023-11-22 | Stop reason: HOSPADM

## 2023-11-09 RX ORDER — OXYTOCIN/0.9 % SODIUM CHLORIDE 30/500 ML
340 PLASTIC BAG, INJECTION (ML) INTRAVENOUS CONTINUOUS PRN
Status: DISCONTINUED | OUTPATIENT
Start: 2023-11-09 | End: 2023-11-22 | Stop reason: HOSPADM

## 2023-11-09 RX ORDER — ONDANSETRON 2 MG/ML
INJECTION INTRAMUSCULAR; INTRAVENOUS
Status: COMPLETED
Start: 2023-11-09 | End: 2023-11-09

## 2023-11-09 RX ORDER — ONDANSETRON 2 MG/ML
4 INJECTION INTRAMUSCULAR; INTRAVENOUS EVERY 6 HOURS PRN
Status: DISCONTINUED | OUTPATIENT
Start: 2023-11-09 | End: 2023-11-23 | Stop reason: HOSPADM

## 2023-11-09 RX ORDER — FENTANYL CITRATE 50 UG/ML
50 INJECTION, SOLUTION INTRAMUSCULAR; INTRAVENOUS EVERY 30 MIN PRN
Status: DISCONTINUED | OUTPATIENT
Start: 2023-11-09 | End: 2023-11-22 | Stop reason: HOSPADM

## 2023-11-09 RX ORDER — CITRIC ACID/SODIUM CITRATE 334-500MG
30 SOLUTION, ORAL ORAL
Status: DISCONTINUED | OUTPATIENT
Start: 2023-11-09 | End: 2023-11-22 | Stop reason: HOSPADM

## 2023-11-09 RX ORDER — ONDANSETRON 4 MG/1
4 TABLET, ORALLY DISINTEGRATING ORAL EVERY 6 HOURS PRN
Status: DISCONTINUED | OUTPATIENT
Start: 2023-11-09 | End: 2023-11-09

## 2023-11-09 RX ORDER — PENICILLIN G POTASSIUM 5000000 [IU]/1
5 INJECTION, POWDER, FOR SOLUTION INTRAMUSCULAR; INTRAVENOUS ONCE
Status: COMPLETED | OUTPATIENT
Start: 2023-11-09 | End: 2023-11-09

## 2023-11-09 RX ORDER — MISOPROSTOL 200 UG/1
800 TABLET ORAL
Status: DISCONTINUED | OUTPATIENT
Start: 2023-11-09 | End: 2023-11-22 | Stop reason: HOSPADM

## 2023-11-09 RX ORDER — IBUPROFEN 800 MG/1
800 TABLET, FILM COATED ORAL
Status: DISPENSED | OUTPATIENT
Start: 2023-11-09 | End: 2023-11-14

## 2023-11-09 RX ORDER — ONDANSETRON 2 MG/ML
4 INJECTION INTRAMUSCULAR; INTRAVENOUS EVERY 6 HOURS PRN
Status: DISCONTINUED | OUTPATIENT
Start: 2023-11-09 | End: 2023-11-22 | Stop reason: HOSPADM

## 2023-11-09 RX ORDER — OXYTOCIN 10 [USP'U]/ML
10 INJECTION, SOLUTION INTRAMUSCULAR; INTRAVENOUS
Status: DISCONTINUED | OUTPATIENT
Start: 2023-11-09 | End: 2023-11-22 | Stop reason: HOSPADM

## 2023-11-09 RX ADMIN — BETAMETHASONE SODIUM PHOSPHATE AND BETAMETHASONE ACETATE 12 MG: 3; 3 INJECTION, SUSPENSION INTRA-ARTICULAR; INTRALESIONAL; INTRAMUSCULAR at 20:21

## 2023-11-09 RX ADMIN — ONDANSETRON 4 MG: 4 TABLET, ORALLY DISINTEGRATING ORAL at 18:31

## 2023-11-09 RX ADMIN — ONDANSETRON 4 MG: 2 INJECTION INTRAMUSCULAR; INTRAVENOUS at 20:07

## 2023-11-09 RX ADMIN — PENICILLIN G POTASSIUM 5 MILLION UNITS: 5000000 POWDER, FOR SOLUTION INTRAMUSCULAR; INTRAPLEURAL; INTRATHECAL; INTRAVENOUS at 21:57

## 2023-11-09 RX ADMIN — SODIUM CHLORIDE, POTASSIUM CHLORIDE, SODIUM LACTATE AND CALCIUM CHLORIDE: 600; 310; 30; 20 INJECTION, SOLUTION INTRAVENOUS at 20:06

## 2023-11-09 ASSESSMENT — ACTIVITIES OF DAILY LIVING (ADL)
ADLS_ACUITY_SCORE: 18

## 2023-11-10 LAB
ANION GAP SERPL CALCULATED.3IONS-SCNC: 8 MMOL/L (ref 7–15)
BLD PROD TYP BPU: NORMAL
BLD PROD TYP BPU: NORMAL
BLOOD COMPONENT TYPE: NORMAL
BLOOD COMPONENT TYPE: NORMAL
BUN SERPL-MCNC: 7.4 MG/DL (ref 6–20)
CALCIUM SERPL-MCNC: 8.5 MG/DL (ref 8.6–10)
CHLORIDE SERPL-SCNC: 105 MMOL/L (ref 98–107)
CODING SYSTEM: NORMAL
CODING SYSTEM: NORMAL
CREAT SERPL-MCNC: 0.58 MG/DL (ref 0.51–0.95)
CROSSMATCH: NORMAL
CROSSMATCH: NORMAL
DEPRECATED HCO3 PLAS-SCNC: 22 MMOL/L (ref 22–29)
EGFRCR SERPLBLD CKD-EPI 2021: >90 ML/MIN/1.73M2
GLUCOSE SERPL-MCNC: 133 MG/DL (ref 70–99)
HOLD SPECIMEN: NORMAL
POTASSIUM SERPL-SCNC: 4.4 MMOL/L (ref 3.4–5.3)
SODIUM SERPL-SCNC: 135 MMOL/L (ref 135–145)
T PALLIDUM AB SER QL: NONREACTIVE
UNIT ABO/RH: NORMAL
UNIT ABO/RH: NORMAL
UNIT NUMBER: NORMAL
UNIT NUMBER: NORMAL
UNIT STATUS: NORMAL
UNIT STATUS: NORMAL
UNIT TYPE ISBT: 6200
UNIT TYPE ISBT: 6200

## 2023-11-10 PROCEDURE — 250N000011 HC RX IP 250 OP 636: Mod: JZ | Performed by: FAMILY MEDICINE

## 2023-11-10 PROCEDURE — 36415 COLL VENOUS BLD VENIPUNCTURE: CPT | Performed by: FAMILY MEDICINE

## 2023-11-10 PROCEDURE — 258N000003 HC RX IP 258 OP 636: Performed by: FAMILY MEDICINE

## 2023-11-10 PROCEDURE — 250N000013 HC RX MED GY IP 250 OP 250 PS 637: Performed by: FAMILY MEDICINE

## 2023-11-10 PROCEDURE — 80048 BASIC METABOLIC PNL TOTAL CA: CPT | Performed by: FAMILY MEDICINE

## 2023-11-10 PROCEDURE — 120N000001 HC R&B MED SURG/OB

## 2023-11-10 RX ORDER — HYDROXYZINE HYDROCHLORIDE 50 MG/1
50 TABLET, FILM COATED ORAL EVERY 6 HOURS PRN
Status: COMPLETED | OUTPATIENT
Start: 2023-11-10 | End: 2023-11-11

## 2023-11-10 RX ORDER — BETAMETHASONE SODIUM PHOSPHATE AND BETAMETHASONE ACETATE 3; 3 MG/ML; MG/ML
12 INJECTION, SUSPENSION INTRA-ARTICULAR; INTRALESIONAL; INTRAMUSCULAR; SOFT TISSUE ONCE
Status: DISCONTINUED | OUTPATIENT
Start: 2023-11-10 | End: 2023-11-10

## 2023-11-10 RX ADMIN — HYDROXYZINE HYDROCHLORIDE 50 MG: 50 TABLET, FILM COATED ORAL at 20:35

## 2023-11-10 RX ADMIN — SODIUM CHLORIDE, POTASSIUM CHLORIDE, SODIUM LACTATE AND CALCIUM CHLORIDE: 600; 310; 30; 20 INJECTION, SOLUTION INTRAVENOUS at 07:30

## 2023-11-10 RX ADMIN — PENICILLIN G 3 MILLION UNITS: 3000000 INJECTION, SOLUTION INTRAVENOUS at 14:32

## 2023-11-10 RX ADMIN — SODIUM CHLORIDE, POTASSIUM CHLORIDE, SODIUM LACTATE AND CALCIUM CHLORIDE: 600; 310; 30; 20 INJECTION, SOLUTION INTRAVENOUS at 17:22

## 2023-11-10 RX ADMIN — PENICILLIN G 3 MILLION UNITS: 3000000 INJECTION, SOLUTION INTRAVENOUS at 10:28

## 2023-11-10 RX ADMIN — PENICILLIN G 3 MILLION UNITS: 3000000 INJECTION, SOLUTION INTRAVENOUS at 02:07

## 2023-11-10 RX ADMIN — PENICILLIN G 3 MILLION UNITS: 3000000 INJECTION, SOLUTION INTRAVENOUS at 18:38

## 2023-11-10 RX ADMIN — PENICILLIN G 3 MILLION UNITS: 3000000 INJECTION, SOLUTION INTRAVENOUS at 06:25

## 2023-11-10 ASSESSMENT — ACTIVITIES OF DAILY LIVING (ADL)
ADLS_ACUITY_SCORE: 18

## 2023-11-10 NOTE — H&P
Maternal Admission H&P  [facility]  Date of Admission: 2023  Date of Service: 2023    Name      Dina Pena         1990  MRN       [id number]  PCP        Dr. Tianna Ellis [facility]    ________________________________________________________________________    Assessment and Plan:  33 year old  at 33w0d with history of shortened cervix here now with spontaneous dilation to 5 cm.   labor: No active contractions but has a history of spontaneous dilation.  Irritability on toco.  Patient received 2 doses of betamethasone at 23 weeks.  Betamethasone given today.  NICU informed.  Does not qualify for magnesium or tocolysis.   Group B strep: Positive - will start GBS prophylaxis for now given spontaneous dilation to 5 cm and active vomiting.  Prior  section 2022 for abnormal fetal heart tracing.  Confirmed vertex by bedside ultrasound.  TOLAC consent with Dr. Carlee Gauthier.  Vomiting: Daughter recently had viral gastroenteritis.  Infectious workup is negative.  Anticipate viral etiology. Zofran, IV fluids prn.  Likely contributor to uterine irritability.  Case discussed with Dr. Milton on call tonight with [facility].  ________________________________________________________________________    Chief Complaint: abdominal cramping.    HPI: Dina Pena is a 33 year old woman at 33w0d, based on an Estimated Date of Delivery: Dec 28, 2023. By 7w0d ultrasound.  She has a history of shortened cervix s/p cerclage and subsequent cerclage removal due to  labor.  She presents with abdominal cramping.  She has a history of  labor resulting in multiple hospital admissions and observations.  She notes this cramping feels different and radiates to the back.      Upon arrival to the hospital this afternoon NST was reactive, some irritability on toco, but no organized contractions.  Soon after arrival she started feeling nauseous and vomiting.  Vitals normal.  Normal CBC, CMP.  UA negative.  Wet prep postive for yeast.  Zofran 4 mg was given orally.  IV fluids were started.  Unclear how much zofran she absorved.  Another 4 mg given IV zofran given and patient did stop vomiting.  She denies fever, chills, chest pain, SOB, pain with urination.  Cervix on arrival: 5cm/ 80%/0 station.     Prenatal history:   1) Hx of  section 2022 for abnormal fetal heart tracing.  2) Shortened cervix dx at 20 week US.  Welch cerclage placed 2023.  Cerclage removed 2023 due to ongoing contractions.  3)  labor: hospitalized  - 2023.  Received magnesium, Betamethasone x 2.  Indomethacin tocolysis.  4) Positive antibody screen - 1/10 unidentified Antibodies 2023 that was negative on repeats.  5) Spontaneous dilation to 3 cm on 10/02/2023 without contractions.  6) Fetal renal pyelectasis - bilateral 2023 - resolved on follow up ultrasound 10/05/2023  7) GBS positive 2023.  8) S/P L4-L5 fusion with history of epidural with prior delivery.    Patient Active Problem List    Diagnosis Date Noted    Premature cervical dilation 10/02/2023     Priority: Medium    Encounter for triage in pregnant patient 2023     Priority: Medium     labor 2023     Priority: Medium      OB History    Para Term  AB Living   2 1 1 0 0 1   SAB IAB Ectopic Multiple Live Births   0 0 0 0 1      # Outcome Date GA Lbr Uli/2nd Weight Sex Delivery Anes PTL Lv   2 Current            1 Term 22 39w6d    CS-LTranv   KENNEDY     Review of Systems Negative except for HPT.  Past Medical History:   Diagnosis Date    Acquired spondylolisthesis     acquired lumbar    L4-L5 disc bulge     Spine fracture 10/2011      Past Surgical History:   Procedure Laterality Date    CERCLAGE CERVICAL N/A 2023    Procedure: Cerclage cervical;  Surgeon: Radha Vuong MD;  Location:   L+D    FUSION SPINE POSTERIOR ONE LEVEL  12/21/2011    L5-S1 fracture    OPEN REDUCTION INTERNAL FIXATION WRIST Left 4/27/2016    Procedure: OPEN REDUCTION INTERNAL FIXATION WRIST;  Surgeon: Isra Hu MD;  Location: [facility] OR    THROAT SURGERY  2009    For abscess   Blood transfusion related (informational only) and Nka [no known allergies]  Family History   Problem Relation Age of Onset    Heart Disease Paternal Grandmother         Heart disease    Heart Disease Paternal Grandfather         Heart disease    Heart Disease Paternal Grandmother     Heart Disease Paternal Grandfather      Social History     Socioeconomic History    Marital status:      Spouse name: Not on file    Number of children: Not on file    Years of education: Not on file    Highest education level: Not on file   Occupational History    Occupation: PCA     Employer: STUDENT     Comment: [employer]   Tobacco Use    Smoking status: Never    Smokeless tobacco: Never   Substance and Sexual Activity    Alcohol use: No    Drug use: Never    Sexual activity: Not Currently     Partners: Male   Other Topics Concern    Not on file   Social History Narrative    Not on file     Social Determinants of Health     Financial Resource Strain: Not on file   Food Insecurity: Not on file   Transportation Needs: Not on file   Physical Activity: Not on file   Stress: Not on file   Social Connections: Not on file   Interpersonal Safety: Not on file   Housing Stability: Not on file     Prior to Admission Medication List  Medications Prior to Admission   Medication Sig Dispense Refill Last Dose    aspirin 81 MG EC tablet Take 81 mg by mouth daily   11/8/2023    Prenatal Vit-Fe Fumarate-FA (PRENATAL MULTIVITAMIN  PLUS IRON) 27-1 MG TABS Take by mouth daily   11/8/2023    PROGESTERONE 200 MG CAPS COMPOUND (PROGESTERONE 200 MG CAPS COMPOUND) 1 capsule At Bedtime Takes vaginal suppository   11/8/2023    senna-docusate (SENOKOT-S;PERICOLACE) 8.6-50  MG per tablet Take 1-2 tablets by mouth daily as needed for constipation 15 tablet 0 Past Month    valACYclovir (VALTREX) 500 MG tablet Take 500 mg by mouth daily At bedtime   11/8/2023      Allergies  Allergies   Allergen Reactions    Blood Transfusion Related (Informational Only) Other (See Comments)     Patient has a history of a clinically significant antibody against RBC antigens.  A delay in compatible RBCs may occur.    Nka [No Known Allergies]      Immunization History   Administered Date(s) Administered    COVID-19 MONOVALENT 12+ (Pfizer) 03/11/2021, 04/08/2021, 10/14/2021      Physical Exam  Patient Vitals for the past 24 hrs:   BP Temp Temp src Resp   11/09/23 1723 109/69 98.4  F (36.9  C) Oral 16     Wt Readings from Last 1 Encounters:   10/02/23 74 kg (163 lb 2.3 oz)   Prepregnancy: Pregravid weight not on file, BMI Could not be calculated. Total gain: Not found. (expected gain: Could not be calculated).    HEART: RRR, no murmur  LUNGS: CTA bilaterally  Fetal Heart Tones: Baseline 140 bpm, variability moderate (6-25 bpm), no decelerations. Reactive.  CONTRACTIONS:  uterine irritability, rare intermittent contractions.  CERVIX: dilation 5 cm , 80% effaced, soft, and 0 station.  FLUID: None noted.  Fetal Presentation: vertex.  Labs    Admission on 11/09/2023   Component Date Value Ref Range Status    Color Urine 11/09/2023 Light Yellow  Colorless, Straw, Light Yellow, Yellow Final    Appearance Urine 11/09/2023 Clear  Clear Final    Glucose Urine 11/09/2023 Negative  Negative mg/dL Final    Bilirubin Urine 11/09/2023 Negative  Negative Final    Ketones Urine 11/09/2023 Negative  Negative mg/dL Final    Specific Gravity Urine 11/09/2023 1.018  1.001 - 1.030 Final    Blood Urine 11/09/2023 Negative  Negative Final    pH Urine 11/09/2023 7.0  5.0 - 7.0 Final    Protein Albumin Urine 11/09/2023 Negative  Negative mg/dL Final    Urobilinogen Urine 11/09/2023 <2.0  <2.0 mg/dL Final    Nitrite Urine 11/09/2023  Negative  Negative Final    Leukocyte Esterase Urine 11/09/2023 Negative  Negative Final    Mucus Urine 11/09/2023 Present (A)  None Seen /LPF Final    RBC Urine 11/09/2023 0  <=2 /HPF Final    WBC Urine 11/09/2023 <1  <=5 /HPF Final    Squamous Epithelials Urine 11/09/2023 1  <=1 /HPF Final    Trichomonas 11/09/2023 Absent  Absent Final    Yeast 11/09/2023 Present (A)  Absent Final    Clue Cells 11/09/2023 Absent  Absent Final    WBCs/high power field 11/09/2023 3+ (A)  None Final    Sodium 11/09/2023 135  135 - 145 mmol/L Final    Reference intervals for this test were updated on 09/26/2023 to more accurately reflect our healthy population. There may be differences in the flagging of prior results with similar values performed with this method. Interpretation of those prior results can be made in the context of the updated reference intervals.     Potassium 11/09/2023 3.3 (L)  3.4 - 5.3 mmol/L Final    Carbon Dioxide (CO2) 11/09/2023 19 (L)  22 - 29 mmol/L Final    Anion Gap 11/09/2023 12  7 - 15 mmol/L Final    Urea Nitrogen 11/09/2023 9.5  6.0 - 20.0 mg/dL Final    Creatinine 11/09/2023 0.47 (L)  0.51 - 0.95 mg/dL Final    GFR Estimate 11/09/2023 >90  >60 mL/min/1.73m2 Final    Calcium 11/09/2023 9.0  8.6 - 10.0 mg/dL Final    Chloride 11/09/2023 104  98 - 107 mmol/L Final    Glucose 11/09/2023 90  70 - 99 mg/dL Final    Alkaline Phosphatase 11/09/2023 77  35 - 104 U/L Final    AST 11/09/2023 16  0 - 45 U/L Final    Reference intervals for this test were updated on 6/12/2023 to more accurately reflect our healthy population. There may be differences in the flagging of prior results with similar values performed with this method. Interpretation of those prior results can be made in the context of the updated reference intervals.    ALT 11/09/2023 12  0 - 50 U/L Final    Reference intervals for this test were updated on 6/12/2023 to more accurately reflect our healthy population. There may be differences in the  flagging of prior results with similar values performed with this method. Interpretation of those prior results can be made in the context of the updated reference intervals.      Protein Total 11/09/2023 6.7  6.4 - 8.3 g/dL Final    Albumin 11/09/2023 3.6  3.5 - 5.2 g/dL Final    Bilirubin Total 11/09/2023 0.6  <=1.2 mg/dL Final    WBC Count 11/09/2023 12.5 (H)  4.0 - 11.0 10e3/uL Final    RBC Count 11/09/2023 4.22  3.80 - 5.20 10e6/uL Final    Hemoglobin 11/09/2023 13.1  11.7 - 15.7 g/dL Final    Hematocrit 11/09/2023 37.9  35.0 - 47.0 % Final    MCV 11/09/2023 90  78 - 100 fL Final    MCH 11/09/2023 31.0  26.5 - 33.0 pg Final    MCHC 11/09/2023 34.6  31.5 - 36.5 g/dL Final    RDW 11/09/2023 13.5  10.0 - 15.0 % Final    Platelet Count 11/09/2023 206  150 - 450 10e3/uL Final      Group B Strep PCR   Date Value Ref Range Status   08/29/2023 Positive (A) Negative Final     Comment:     ALERT: Streptococcus agalactiae (Group B Streptococcus) has a high rate of resistance to clindamycin. Therefore, clindamycin is not recommended for treatment unless susceptibility testing has been performed.      Antibody Screen   Date Value Ref Range Status   10/02/2023 Negative Negative Final     Hepatitis B Surface Antigen   Date Value Ref Range Status   09/03/2023 Nonreactive Nonreactive Final     Hemoglobin   Date Value Ref Range Status   11/09/2023 13.1 11.7 - 15.7 g/dL Final        Completed by:   Tianna Ellis MD  [facility]  11/9/2023 8:52 PM   used: Not needed.

## 2023-11-10 NOTE — PROGRESS NOTES
Labor Progress Note  [facility]  Date of Admission: 2023  Date of Service: 11/10/2023    Assessment/Plan  33 year old  at 33w1d gestational age admitted for  labor.  Does not appear to be in active labor at this time.  Completed rescue dose of betamethasone last night.  Received 5 doses PCN for GBS..     - Continue to monitor FHR  - Complete 6th dose of PCN then hold until active contractions or labor  - Vistaril  mg prn for sleep as needed  - Expectant management- anticipate     Subjective  32 yo  at 33w1d gestation admitted with  labor.  Cervix dilated to 6 cm.  Contractions have stopped.  Received rescue dose of betamethasone last pm.  Membranes intact.  VSS    Objective  Vital signs in last 24 hours  Temp:  [98  F (36.7  C)-99.1  F (37.3  C)] 99.1  F (37.3  C)  Pulse:  [68-71] 68  Resp:  [16-18] 16  BP: ()/(42-71) 102/57  SpO2:  [95 %-98 %] 95 %      Physical Exam  General:   Abd: Gravid, soft, nontender  Cervix: 6cm,  FHR: Baseline 130s mod variability/present accels/no decels; Category 1 tracing  [place]: Contractions no current contractions        Rebecca Bellamy MD  [facility]

## 2023-11-10 NOTE — CONSULTS
_       [facility]                      Neonatology Advanced Practice Antepartum Counseling Consult      I was asked to provide antepartum counseling for Dina Pena at the request of Tianna Ellis MD secondary to PTL. Ms. Pena is currently 33 weeks and has a hx significant for PTL, cervical change. Betamethasone was administered at 23 weeks and recommend a rescue dose now. Ms. Pena was counseled on the expected hospital course, low potential risks, and generally positive outcomes associated with an infant born at approximately 33 weeks gestation. The counseling included: initial delivery room stabilization, respiratory course, lung development, hyperbilirubinemia, nutrition, growth and development, and long term outcomes. Please feel free to call with any additional questions or concerns.          ROD Knutson 11/10/2023 9:02 AM     Advanced Practice Service    Intensive Care Unit  [facility]      Floor Time (min): 15  Face to Face Time (min): 15  Total Time (minutes): 20  More than 50% of my time was spent in direct, face to face, antepartum counseling with the above patient.

## 2023-11-10 NOTE — PROGRESS NOTES
Dr. Bellamy in department and updated on patient's status. Patient able to sleep this afternoon. Unable to  contractions on toco while patient is on her side, patient stated not feeling any contractions after her rest. Plan to monitor closely. Patient able to eat small amounts. Orders to discontinue penicillin after 6th dose unless patient goes back into active labor. On call, Dr. Haskins, to follow this evening unless contacted otherwise.

## 2023-11-10 NOTE — PROGRESS NOTES
OB PROGRESS NOTE      IUP at 33w1d, admitted for spontaneous dilation to 5 cm.      SUBJECTIVE:  Called to evaluate change in FHTs.  Baseline changed to 110 bpm with marked variability.  Patient notes no abdominal pain.  No longer nauseous.  Some intermittent cramping sometimes to the back.  No leakage of fluid.  No contractions on [place].  Patient was repositioned from back to ri    OBJECTIVE:  /55   Temp 98.1  F (36.7  C) (Oral)   Resp 16   LMP 2023 (Approximate)   SpO2 95%    FHTs: on right side: 120s baseline, moderate variability, no decels.    Cx: 5cm dilated, now with bulging bag.    ASSESSMENT:  33 year old : 2 PTAL: 1001 at 33 weeks with history of shortened cervix here with spontaneous dilation to 5 cm.  Membranes intact, but now bulging.      Continue monitoring.  GBS prophylaxis.      Tianna Ellis MD

## 2023-11-10 NOTE — PROGRESS NOTES
OB PROGRESS NOTE      IUP at 33w1d, admitted for spontaneous dilation to 5 cm.      SUBJECTIVE:  Called to hospital for increased frequency of contraction and increased pelvic pressure.  Contractions increased to about 6 in an hour and now accelerating to every 2-4 min.        OBJECTIVE:  BP 90/54 (BP Location: Left arm, Patient Position: Semi-Stearns's, Cuff Size: Adult Regular)   Pulse 71   Temp 98.1  F (36.7  C) (Oral)   Resp 16   LMP 2023 (Approximate)   SpO2 95%    GEN: A+ O x 3 NAD  Abd: nontender.  Cervix: 6 cm/80%/very soft, Membranes intact.      ASSESSMENT:  33 year old : 2 PTAL: 1001 at 33 weeks with history of shortened cervix here with spontaneous dilation to 5 cm, now dilated to 6 cm with increased contractions.    Appears to be in active labor.  Anticipate .  Tianna Ellis MD

## 2023-11-10 NOTE — PROGRESS NOTES
Called and updated Dr. Ellis on patient's status. Patient stating contractions are getting more intense, rating them 4-5/10, higher than 1/10 from earlier. 6 contractions in the last hour, every 8-10 min. Provider coming in unit.

## 2023-11-10 NOTE — PROVIDER NOTIFICATION
11/09/23 1905   Provider Notification   Provider Name/Title Dr. Ellis   Method of Notification Phone   Request Evaluate-Remote   Notification Reason Lab Results;Status Update     Updated Dr Ellis on positive yeast result and negative UA. Via TORB, ordered BPP, IV fluid infusion, CMP, CBC and SVE. Would like to be updated on SVE. Per MD, no need to treat yeast infection. Would like rescue dose of betamethasone if pt kicks into active labor. Pt already received beta x2 around 20 weeks.

## 2023-11-10 NOTE — PLAN OF CARE
Data: [facility]: 2023  5:15 PM.  Patient here for R/O  labor. Patient is a .  Prenatal record reviewed. Pregnancy  has been complicated by  labor/contractions and cerclage.  Gestational Age 33w0d. VSS. Fetal movement present. Patient denies leaking of vaginal fluid/rupture of membranes, vaginal bleeding, visual disturbances, epigastric or URQ pain, significant edema. Support person is not present.   Action: Verbal consent for EFM.  Admission assessment completed. Bill of rights reviewed.  Response: Patient verbalized agreement with plan. Will contact Dr Tianna Ellis with update and further orders.

## 2023-11-10 NOTE — PROGRESS NOTES
OB PROGRESS NOTE      IUP at 33w1d, admitted for spontaneous dilation to 5 cm.      SUBJECTIVE:  This morning, patient is feeling well.  FHTs have been 110 -120 baseline, moderate variability with positive accelerations, no decelerations.  Rare intermittent contractions.  No leakage of fluid.    OBJECTIVE:  BP (!) 85/42 (BP Location: Left arm, Patient Position: Right side, Cuff Size: Adult Regular)   Temp 99  F (37.2  C) (Oral)   Resp 16   LMP 2023 (Approximate)   SpO2 95%    GEN: A+ O x 3 NAD  Abd: nontender.      ASSESSMENT:  33 year old : 2 PTAL: 1001 at 33 weeks with history of shortened cervix here with spontaneous dilation to 5 cm.  Membranes intact, but now bulging.    Continue monitoring.  GBS prophylaxis.    Vomiting: Slight low potassium on lab, but that was while she was vomiting.  Recheck BMP today.  Currently has clears diet.    Tianna Ellis MD

## 2023-11-11 PROCEDURE — 250N000013 HC RX MED GY IP 250 OP 250 PS 637: Performed by: FAMILY MEDICINE

## 2023-11-11 PROCEDURE — 120N000001 HC R&B MED SURG/OB

## 2023-11-11 RX ORDER — MICONAZOLE NITRATE 2 %
1 CREAM WITH APPLICATOR VAGINAL AT BEDTIME
Status: DISCONTINUED | OUTPATIENT
Start: 2023-11-11 | End: 2023-11-12 | Stop reason: SINTOL

## 2023-11-11 RX ADMIN — HYDROXYZINE HYDROCHLORIDE 50 MG: 50 TABLET, FILM COATED ORAL at 20:32

## 2023-11-11 ASSESSMENT — ACTIVITIES OF DAILY LIVING (ADL)
ADLS_ACUITY_SCORE: 18

## 2023-11-11 NOTE — PLAN OF CARE
Category 1 strip overnight. Infrequent contractions that patient denies feeling. Sleep medications given and patient able to rest through night between cares. Patient denies pain. Blood pressure low, however patient asymptomatic and HR WNL.  No vaginal bleeding or leaking of fluids.       Problem: Labor  Goal: Stable Fetal Wellbeing  Outcome: Progressing  Goal: Absence of Infection Signs and Symptoms  Outcome: Progressing  Goal: Acceptable Pain Control  Outcome: Progressing  Goal: Normal Uterine Contraction Pattern  Outcome: Progressing   Goal Outcome Evaluation:

## 2023-11-11 NOTE — PLAN OF CARE
Problem: Adult Inpatient Plan of Care  Goal: Plan of Care Review  Outcome: Progressing   Goal Outcome Evaluation:  Patient progressing well. VSS and afebrile. Patient denies contractions, cramping, and LOF at this time. NSTs q2hr. Patient in good spirits. Family visiting today. Will continue to monitor.

## 2023-11-11 NOTE — PROGRESS NOTES
OB PROGRESS NOTE      IUP at 33w2d, admitted for spontaneous dilation to 5 cm and PTL with dilation to 6 cm.        SUBJECTIVE:  This morning, patient is feeling well.  NST has been category 1.  Patient notes no subjective contractions overnight.  Some uterine irritability on TOCO.  No leakage of fluid.  Membranes were intact and bulging yesterday.    OBJECTIVE:  BP 92/54 (BP Location: Left arm, Patient Position: Semi-Stearns's, Cuff Size: Adult Regular)   Pulse 68   Temp 98.4  F (36.9  C) (Oral)   Resp 16   LMP 2023 (Approximate)   SpO2 96%    GEN: A+ O x 3 NAD      ASSESSMENT:  33 year old : 2 PTAL: 1001 at 33 w2d here with  labor with dilation to 6 cm.    Received third rescue dose of betamethasone, no further steroids.  GBS positive: Received 6 doses when nathaniel.  Will hold on it.  Restart when becoming more active.  Will switch to intermittent fetal monitoring.   BPP on 2023.  Encouraged ambulation.  Vomiting: resolved.  Electrolytes normalized.  Will liberalize diet and SL IV.  Yeast on wet prep. Asymptomatic. Will treat with monistat.      Tianna Ellis MD

## 2023-11-12 PROCEDURE — 250N000013 HC RX MED GY IP 250 OP 250 PS 637: Performed by: FAMILY MEDICINE

## 2023-11-12 PROCEDURE — 120N000001 HC R&B MED SURG/OB

## 2023-11-12 RX ORDER — HYDROXYZINE HYDROCHLORIDE 50 MG/1
50 TABLET, FILM COATED ORAL EVERY 6 HOURS PRN
Status: DISCONTINUED | OUTPATIENT
Start: 2023-11-12 | End: 2023-11-23 | Stop reason: HOSPADM

## 2023-11-12 RX ADMIN — HYDROXYZINE HYDROCHLORIDE 50 MG: 50 TABLET, FILM COATED ORAL at 21:05

## 2023-11-12 RX ADMIN — MICONAZOLE NITRATE 1 APPLICATOR: 20 CREAM VAGINAL at 00:25

## 2023-11-12 RX ADMIN — METHYLCELLULOSE 500 MG: 500 TABLET ORAL at 14:32

## 2023-11-12 ASSESSMENT — ACTIVITIES OF DAILY LIVING (ADL)
ADLS_ACUITY_SCORE: 18

## 2023-11-12 NOTE — PLAN OF CARE
Patient denies contractions overnight. Sleep medications given. Able to rest between cares. Category 1 strip overnight. Reactive NST's.       Problem: Labor  Goal: Stable Fetal Wellbeing  Outcome: Progressing  Goal: Absence of Infection Signs and Symptoms  Outcome: Progressing  Goal: Acceptable Pain Control  Outcome: Progressing   Goal Outcome Evaluation:

## 2023-11-12 NOTE — PROGRESS NOTES
OB PROGRESS NOTE      IUP at 33w3d, admitted for spontaneous dilation to 5 cm and PTL with dilation to 6 cm.        SUBJECTIVE:  Feeling well.  No contractions.  Some difficulty with BM.  No LOF.      OBJECTIVE:  BP 90/44 (BP Location: Left arm, Patient Position: Right side, Cuff Size: Adult Regular)   Pulse 61   Temp 99  F (37.2  C) (Oral)   Resp 16   LMP 2023 (Approximate)   SpO2 97%    GEN: A+ O x 3 NAD  NST: Category 1 reactive.    ASSESSMENT:  33 year old : 2 PTAL: 1001 at 33 w3d here with  labor with dilation to 6 cm.    Received third rescue dose of betamethasone, no further steroids.  GBS positive: Received 6 doses when nathaniel.  Will hold on it.  Restart when becoming more active.  Will start fiber supplement.    Will plan for BPP with EFW on Monday morning and if not nathaniel consider remainder of prenatal course.    Tianna Ellis MD

## 2023-11-12 NOTE — PROGRESS NOTES
"Dr. Ellis called and updated with patient's status. Patient stated \"feeling off\" for the last hour. Monitors applied, picking up contractions, palpating mild, category 1 tracing. Orders to monitor and update in an hour.  "

## 2023-11-13 ENCOUNTER — ANCILLARY PROCEDURE (OUTPATIENT)
Dept: ULTRASOUND IMAGING | Facility: HOSPITAL | Age: 33
End: 2023-11-13
Attending: FAMILY MEDICINE
Payer: COMMERCIAL

## 2023-11-13 PROCEDURE — 76819 FETAL BIOPHYS PROFIL W/O NST: CPT

## 2023-11-13 PROCEDURE — 250N000011 HC RX IP 250 OP 636: Performed by: FAMILY MEDICINE

## 2023-11-13 PROCEDURE — 250N000013 HC RX MED GY IP 250 OP 250 PS 637: Performed by: FAMILY MEDICINE

## 2023-11-13 PROCEDURE — 120N000001 HC R&B MED SURG/OB

## 2023-11-13 PROCEDURE — 76816 OB US FOLLOW-UP PER FETUS: CPT | Mod: 26 | Performed by: OBSTETRICS & GYNECOLOGY

## 2023-11-13 PROCEDURE — 99222 1ST HOSP IP/OBS MODERATE 55: CPT | Mod: 25 | Performed by: OBSTETRICS & GYNECOLOGY

## 2023-11-13 PROCEDURE — 76816 OB US FOLLOW-UP PER FETUS: CPT | Mod: XS

## 2023-11-13 RX ORDER — BETAMETHASONE SODIUM PHOSPHATE AND BETAMETHASONE ACETATE 3; 3 MG/ML; MG/ML
12 INJECTION, SUSPENSION INTRA-ARTICULAR; INTRALESIONAL; INTRAMUSCULAR; SOFT TISSUE ONCE
Status: COMPLETED | OUTPATIENT
Start: 2023-11-13 | End: 2023-11-13

## 2023-11-13 RX ADMIN — BETAMETHASONE ACETATE AND BETAMETHASONE SODIUM PHOSPHATE 12 MG: 3; 3 INJECTION, SUSPENSION INTRA-ARTICULAR; INTRALESIONAL; INTRAMUSCULAR; SOFT TISSUE at 12:20

## 2023-11-13 RX ADMIN — HYDROXYZINE HYDROCHLORIDE 50 MG: 50 TABLET, FILM COATED ORAL at 20:46

## 2023-11-13 RX ADMIN — METHYLCELLULOSE 500 MG: 500 TABLET ORAL at 08:14

## 2023-11-13 ASSESSMENT — ACTIVITIES OF DAILY LIVING (ADL)
ADLS_ACUITY_SCORE: 18

## 2023-11-13 NOTE — PLAN OF CARE
Category 1 strip overnight. Uterine irritability and infrequent contractions noted. Patient denies feeling contractions overnight. Sleep medications given and patient able to rest between cares. Scheduled BPP completed this morning.     Problem: Adult Inpatient Plan of Care  Goal: Optimal Comfort and Wellbeing  Intervention: Provide Person-Centered Care  Recent Flowsheet Documentation  Taken 11/12/2023 2345 by Betty Kumar RN  Trust Relationship/Rapport:   care explained   choices provided   emotional support provided   empathic listening provided   questions answered   questions encouraged   reassurance provided   thoughts/feelings acknowledged   Goal Outcome Evaluation:

## 2023-11-13 NOTE — PLAN OF CARE
Problem: Adult Inpatient Plan of Care  Goal: Plan of Care Review  Outcome: Progressing   Goal Outcome Evaluation:  VSS and afebrile. Patient stated feeling regular contractions earlier this afternoon. Pain reached 5-6/10 pain. Currently, pain decreased significantly, contractions spaced out. Provider evaluated at bedside. Plan to continue continuous monitoring until contractions are spaced out more.

## 2023-11-13 NOTE — PROGRESS NOTES
"OB PROGRESS NOTE      IUP at 33w3d, admitted for spontaneous dilation to 5 cm and PTL with dilation to 6 cm.        SUBJECTIVE:  Patient started feeling \"off\" and then started having contractions.  Palpating initially mild then getting stronger.  Occurring q3-5 min.  After 2 hours, contractions started quieting down.    OBJECTIVE:  /65 (BP Location: Right arm, Patient Position: Semi-Stearns's, Cuff Size: Adult Regular)   Pulse 61   Temp 99  F (37.2  C) (Oral)   Resp 18   LMP 2023 (Approximate)   SpO2 100%    GEN: A+ O x 3 NAD  NST: Category 1 reactive.  Cervix: 6-7 cm/80%/0station  cervix is very soft.    ASSESSMENT:  33 year old : 2 PTAL: 1001 at 33 w3d here with  labor with dilation to 6 cm.  Now with some progression.    Received third rescue dose of betamethasone, no further steroids.  Continue monitoring.  Uterus is now quiet.  If becomes active again, will restart GBS prophylaxis.    Patient would like to discontinue monistat as it is uncomfortable.    Tianna Ellis MD  "

## 2023-11-13 NOTE — CONSULTS
HPI:     Ms. Dina Pena  is , at 33 weeks 4 days with complaints of contractions and pelvic pressure which have been recurrent during this pregnancy. She had a cerclage placed in 2023 at 20 4/7 weeks for cervical dilation. She had several episodes of  contractions and assessment in triage. The cerclage was removed on admission at 23 0/7 weeks in early 2023 when she received her first course of  steroids. Since then she has had two more admissions for contractions and  labor and at her most recent admission in early September was noted to be 3 cm dilated. She was readmitted on  at 33 0/7 weeks. On admission, she was having low abdominal pains and low back pains. Denies LOF or bleeding. Cervical exam reported was 5cm/80%/0. Fetus in vertex presentation. Repeat exam 12 hours later had progressed to 6 cm dilation and last night after 2 hour episode of contractions was 6-7 cm./60% effaced and station 0. No further change since that evaluation performed 18 hours ago. She continues to have episode of nausea and vomiting that are intermittent and associated with uterine contractions.     Ms. Pena has completed a first course of  corticosteroids when her cerclage was removed. Was started on her second rescue course on this admission.     Significant problems this pregnancy:  1) Hx of  section 2022 for abnormal fetal heart tracing.  2) Shortened cervix dx at 20 week US.  Welch cerclage placed 2023.  Cerclage removed 2023 due to ongoing contractions.  3)  labor: hospitalized  - 2023.  Received magnesium, Betamethasone x 2.  Indomethacin tocolysis.  4) Positive antibody screen - 1/10 unidentified Antibodies 2023 that was negative on repeats.  5) Spontaneous dilation to 3 cm on 10/02/2023 without contractions.  6) Fetal renal pyelectasis - bilateral 2023 - resolved on follow up ultrasound  10/05/2023  7) GBS positive 2023.  8) S/P L4-L5 fusion with history of epidural with prior delivery.  Past Medical History:   Diagnosis Date    Acquired spondylolisthesis     acquired lumbar    L4-L5 disc bulge     Spine fracture 10/2011      I have reviewed this patient's family history and updated it with pertinent information if needed.  Family History   Problem Relation Age of Onset    Heart Disease Paternal Grandmother         Heart disease    Heart Disease Paternal Grandfather         Heart disease    Heart Disease Paternal Grandmother     Heart Disease Paternal Grandfather        /62   Pulse 61   Temp 98.1  F (36.7  C) (Oral)   Resp 16   LMP 2023 (Approximate)   SpO2 99%     ROUTINE IP LABS (Last four results)  BMP  Recent Labs   Lab 11/10/23  0635 23    135   POTASSIUM 4.4 3.3*   CHLORIDE 105 104   LAURA 8.5* 9.0   CO2 22 19*   BUN 7.4 9.5   CR 0.58 0.47*   * 90     CBC  Recent Labs   Lab 23   WBC 12.5*   RBC 4.22   HGB 13.1   HCT 37.9   MCV 90   MCH 31.0   MCHC 34.6   RDW 13.5        Ultrasound today:   1. Patient here for a fetal growth scan secondary to a diagnosis of  labor. She is at 33w4d gestational age.   2. Active single fetus with behavior appropriate for gestational age.   3. Appropriate interval fetal growth.   4. Estimated fetal weight is appropriate for gestational age: 2202 grams (39th percentile).  5. None of the anomalies commonly detected by ultrasound were evident in the limited fetal anatomic survey described above.   6. Normal amniotic fluid volume.     Impression and Plan:     We discussed possible etiologies and risk factors for PTD including subclinical intraamniotic infection or inflammation, which has been shown to be associated with PTL. A history of PTD is a risk factor for recurrence in a subsequent pregnancy. Additional risk factors are similar to those associated with spontaneous  rupture of the  membranes and include short interpregnancy interval, short cervical length, second and third-trimester bleeding, low body mass index, low socioeconomic status, cigarette smoking, and illicit drug use. Although each of these risk factors is associated with PTD, it can occur in the absence of recognized risk factors or an obvious cause.     Patients with  contractions without progression of labor should be expectantly managed if no maternal or fetal contraindications exist.      A course of  corticosteroids should be administered to women with PTL  between 24 0/7- 33 6/7 weeks  gestation and may be considered for pregnant women who are at risk of  birth within 7 days, including for those with ruptured membranes, as early as 23 0/7 weeks of gestation.      Women with  labor should be screened for GBS status and are candidates for intrapartum GBS prophylaxis to prevent vertical transmission if GBS positive, had prior infant with GBS sepsis, or positive UTI for GBS during this pregnancy. If status unknown and less than 37 weeks, recommend GBS prophylaxis. Patient was GBS positive on 2023. Recommend repeat testing or PNC prophylaxis is new result is not available at time of delivery.    In patients with a history of PTD, it is recommended to follow up future pregnancies with serial cervical length assessments starting at 16 weeks up to 24 weeks. Cervical length assessments are recommended to be performed every 2 weeks beginning at 16 weeks. If CL decreases to between 25 and 30 mm, recommend weekly assessments.  If CL noted to be less than 25 mm length then we would recommend cervical cerclage placement. After 24 weeks, the clinical interpretation of risk associated with a short cervix is not clear. We discussed that cerclage is generally not placed after 24 weeks gestation of gestation, when the benefit of the procedure is less. In this case, history indicated cerclage is not indicated  as the prior delivery was not consistent with cervical insufficiency.     We recommend progesterone and specifically vaginal micronized progesterone if a short cervix is diagnosed in a future pregnancy. Patient is not a candidate for ultrasound indicated cerclage as primary management for a short cervix, but only if she fails progesterone therapy in a future pregnancy (CL < 10 mm) or cervical dilation without labor is detected.     I have discussed that patient IS CURRENTLY IN  LABOR with a dysfunctional labor pattern. This is based on progressive dilation to 7 cm since admission at 5 cm 96 hours ago. Discharge to home is not recommended for a laboring patient. Additionally, delivery may occur prior to 10 cm dilation due to early gestational age. In order to optimize delivery of a  infant, would recommend inpatient management with surveillance for further progression of labor. If patient reaches 8 cm or more or if PROM occurs recommend augmentation of this dysfunctional labor pattern.     A decision to proceed with augmentation at any time is also reasonable given the alternative risk of precipitous delivery either as outpatient or inpatient of a  infant. It would be safer to proceed with augmentation after completion of steroids than to undergo a precipitous delivery of a  infant.    This has been reviewed in detail with Dr. Ellis.       PLAN:       Recommend cervical assessment and if cervical dilation > 7 cm, PROM occurs recommend recommend proceeding with delivery.    Patient aware of increased risk for PROM, and to report any sign or symptom consistent with PROM to PCP.     Recommend completing current rescue course of two doses of  corticosteroids.    No tocolysis or magnesium sulfate for CP prophylaxis recommended.     If cervical change documented in subsequent assessment and patient reports contractions, recommend inpatient surveillance.     If cervix equal or greater than  8 cm dilated, consider augmentation of labor to prevent precipitous delivery of  infant.    I personally spent a total of 60 minutes, including both face-to-face and non-face-to-face on the date of the encounter, addressing the above diagnosis. Activities performed in this time include chart review, obtaining / reviewing history, performing a medically necessary evaluation, documentation and Charge activities: counseling, care coordination, ordering tests, ordering meds, reviewing results, and speaking with other providers or specialists, equivalent to medical decision making that is of high complexity.          Woo Ureña     Maternal Fetal Medicine     23

## 2023-11-13 NOTE — PLAN OF CARE
Goal Outcome Evaluation:         Pt continues denied feeling contractions or uterine cramps. She had category one fetal tracing. She denied having vaginal leakage.

## 2023-11-13 NOTE — PROGRESS NOTES
"OB PROGRESS NOTE      IUP at 33w4d, admitted for spontaneous dilation to 5 cm and PTL with dilation to 6-7 cm.        SUBJECTIVE:  Patient had a quiet night overnight with contractions every 11-12 minutes.    Feels them very slightly.  Afebrile.  Yesterday evening \"did not feel well and got nauseous\" before the contractions started, similar to Thursday when she was admitted.    OBJECTIVE:  /60   Pulse 61   Temp 97.7  F (36.5  C) (Oral)   Resp 16   LMP 2023 (Approximate)   SpO2 99%    GEN: A+ O x 3 NAD  NST: Category 1 reactive.  Cervix: 6-7 cm/80%/0station  cervix is very soft with membranes intact from 2023 evening.    BPP 8/8.  EFW: 2292g, 64th percentile.    ASSESSMENT:  33 year old : 2 PTAL: 1001 at 33 w4d here with  labor with dilation to 6 cm.  Now with some progression.    Received third rescue dose of betamethasone, no further steroids.  Continue monitoring.  Uterus is now quiet.  If becomes active again, will restart GBS prophylaxis.    Patient would like to discontinue monistat as it is uncomfortable.    Will consult MFM as patient is still pregnant with membranes intact.  Discuss care planning.  Tianna Ellis MD  "

## 2023-11-14 PROCEDURE — 999N000127 HC STATISTIC PERIPHERAL IV START W US GUIDANCE

## 2023-11-14 PROCEDURE — 120N000001 HC R&B MED SURG/OB

## 2023-11-14 PROCEDURE — 250N000013 HC RX MED GY IP 250 OP 250 PS 637: Performed by: FAMILY MEDICINE

## 2023-11-14 RX ADMIN — HYDROXYZINE HYDROCHLORIDE 50 MG: 50 TABLET, FILM COATED ORAL at 21:21

## 2023-11-14 RX ADMIN — METHYLCELLULOSE 500 MG: 500 TABLET ORAL at 11:16

## 2023-11-14 ASSESSMENT — ACTIVITIES OF DAILY LIVING (ADL)
ADLS_ACUITY_SCORE: 18

## 2023-11-14 NOTE — PROGRESS NOTES
2345 - Dina up to BR.  Assessment complete and WNL.  EMX2 applied for Q4 NST.  Denies any uterine tightening.  EMX2 applied.  VSS.

## 2023-11-14 NOTE — PLAN OF CARE
Problem: Adult Inpatient Plan of Care  Goal: Optimal Comfort and Wellbeing  Intervention: Provide Person-Centered Care  Recent Flowsheet Documentation  Taken 11/13/2023 1835 by Melva Hernández RN  Trust Relationship/Rapport:   care explained   questions answered   questions encouraged   reassurance provided   Goal Outcome Evaluation:

## 2023-11-14 NOTE — PLAN OF CARE
Problem: Adult Inpatient Plan of Care  Goal: Plan of Care Review  Outcome: Progressing   Goal Outcome Evaluation:  Patient doing well. VSS and afebrile. Patient stated having a contraction once an hour or so. Otherwise feeling comfortable, denies LOF at this time. Reactive NSTs q2hr during the day. Will continue to monitor.

## 2023-11-14 NOTE — PROGRESS NOTES
0430 - Dina slept well. Easily aroused for placement of EFMX2. Denies noting any uterine tightening throughout the night.  NST reactive.

## 2023-11-15 PROCEDURE — 120N000001 HC R&B MED SURG/OB

## 2023-11-15 PROCEDURE — 250N000013 HC RX MED GY IP 250 OP 250 PS 637: Performed by: FAMILY MEDICINE

## 2023-11-15 RX ADMIN — METHYLCELLULOSE 500 MG: 500 TABLET ORAL at 08:36

## 2023-11-15 RX ADMIN — HYDROXYZINE HYDROCHLORIDE 50 MG: 50 TABLET, FILM COATED ORAL at 21:25

## 2023-11-15 ASSESSMENT — ACTIVITIES OF DAILY LIVING (ADL)
ADLS_ACUITY_SCORE: 18

## 2023-11-15 NOTE — PROGRESS NOTES
Labor Progress Note  [facility]  Date of Admission: 2023  Date of Service: 2023    Assessment/Plan  33 year old  at 33w5d gestation admitted for  labor. Cervix currently dilated to 7 without active contractions with dysfunctional labor pattern.  MFM has been consulted .  Received 2 doses of betamethsone in this hospitalization.  with prior delivery.    - Continue to monitor FHR with intermittent NST  -  Labor augmentation for PROM or cervix dilation reaches 8 cm per MFM recommendations  - early GBS positive, treated in this hospitalization with 6 doses of antibiotics when in more active pattern, not currently receiving antibiotics. Consider recheck GBS or treat as unknown GBS at time of delivery  - Continue with expectant managment    Subjective  32 yo  female at 33w5d gestation admitted in  labor on . No significant contractions over the last day. Intermittent NST monitoring has been normal.  No fluid leakage or bleeding    Reviewed MFM consult from yesterday with patient.  Received second dose of steroids this hospitalization.  Received 6 dosed of antibiotics 11/10 when in more active labor.     Prenatal history:   1) Hx of  section 2022 for abnormal fetal heart tracing.  2) Shortened cervix dx at 20 week US.  Welch cerclage placed 2023.  Cerclage removed 2023 due to ongoing contractions.  3)  labor: hospitalized  - 2023.  Received magnesium, Betamethasone x 2.  Indomethacin tocolysis.  4) Positive antibody screen - 1/10 unidentified Antibodies 2023 that was negative on repeats.  5) Spontaneous dilation to 3 cm on 10/02/2023 without contractions.  6) Fetal renal pyelectasis - bilateral 2023 - resolved on follow up ultrasound 10/05/2023  7) GBS positive 2023.  8) S/P L4-L5 fusion with history of epidural with prior delivery.    Objective  Vital signs in last 24  hours  Temp:  [97.9  F (36.6  C)-98.1  F (36.7  C)] 98.1  F (36.7  C)  Pulse:  [72-78] 72  Resp:  [16-18] 16  BP: (104-127)/(50-69) 109/66  SpO2:  [99 %] 99 %      Physical Exam  General: alert and well appearing  Abd: Gravid, soft, nontender  Cervix: no performed today- prior 7 cm  FHR: Baseline 130/mod variability/present accels/absent decels; Category 1 tracing  [place]: Contractions absent  Extremities: no peripheral edema      Rebecca Bellamy MD  [facility]

## 2023-11-15 NOTE — PLAN OF CARE
Problem: Labor  Goal: Stable Fetal Wellbeing  Outcome: Progressing   Goal Outcome Evaluation:  Patient is stable. VSS and afebrile. NSTs reactive. Patient comfortable, working from home throughout the day. Will continue to monitor.

## 2023-11-15 NOTE — PLAN OF CARE
Reactive NST's during shift. Patient denies feeling contractions or being in pain. Sleep medications given and patient able to sleep comfortably overnight. No vaginal bleeding or leaking of fluids.       Problem: Labor  Goal: Stable Fetal Wellbeing  Outcome: Progressing  Goal: Absence of Infection Signs and Symptoms  Outcome: Progressing  Goal: Acceptable Pain Control  Outcome: Progressing   Goal Outcome Evaluation:

## 2023-11-15 NOTE — PROGRESS NOTES
Labor Progress Note  [facility]  Date of Admission: 2023  Date of Service: 11/15/2023     Assessment/Plan  33 year old  at 33w6d gestation admitted for  labor. Cervix currently dilated to 7 without active contractions with dysfunctional labor pattern.  MFM has been consulted .  Received 2 doses of betamethsone in this hospitalization.  with prior delivery.    - Continue to monitor FHR with intermittent NST  -  Labor augmentation for PROM or cervix dilation reaches 8 cm per MFM recommendations  - early GBS positive, treated in this hospitalization with 6 doses of antibiotics when in more active pattern, not currently receiving antibiotics. Consider recheck GBS or treat as unknown GBS at time of delivery  - Continue with expectant managment    Subjective  34 yo  female at 33w5d gestation admitted in  labor on . Having some little contractions over the day. Intermittent NST monitoring has been normal.  No fluid leakage or bleeding. Patient has continued to work.  present and supportive. Spirits stable.     Prenatal history:   1) Hx of  section 2022 for abnormal fetal heart tracing.  2) Shortened cervix dx at 20 week US.  Welch cerclage placed 2023.  Cerclage removed 2023 due to ongoing contractions.  3)  labor: hospitalized  - 2023.  Received magnesium, Betamethasone x 2.  Indomethacin tocolysis.  4) Positive antibody screen - 1/10 unidentified Antibodies 2023 that was negative on repeats.  5) Spontaneous dilation to 3 cm on 10/02/2023 without contractions.  6) Fetal renal pyelectasis - bilateral 2023 - resolved on follow up ultrasound 10/05/2023  7) GBS positive 2023.  8) S/P L4-L5 fusion with history of epidural with prior delivery.    Objective  Vital signs in last 24 hours  Temp:  [98  F (36.7  C)-98.3  F (36.8  C)] 98.1  F (36.7  C)  Pulse:  [72-78] 72  Resp:   [16-18] 18  BP: ()/(42-69) 91/52  SpO2:  [99 %] 99 %      Physical Exam  General: alert and well appearing  Abd: Gravid, soft, nontender  Cervix: no performed today- prior 7 cm  FHR: Baseline 130/mod variability/present accels/absent decels; Category 1 tracing  [place]: Contractions absent  Extremities: no peripheral edema    Lima Bustillos DO IBCLC  [facility]

## 2023-11-15 NOTE — CONSULTS
Integrative Therapy Consult    Healing PresenceYes  Essential Oils: Topical (EO/Topical Oil)     Lavender - ESTUARDO       Healing Music:       Breathwork:       Guided Imagery:       Acupressure:       Oshibori:       Energy Therapy:       Healing Touch:       Reiki:       Qi Gong:     Massage: Hand, Foot      Targeted Massage:    Sleep Promotion:       Other Therapy:       Intervention Reason: Comfort     Pre and Post Session Scores: Patient Desires Treatment: yes                             Delivery:         Referrals:      Ingrid Alcala

## 2023-11-16 PROCEDURE — 250N000013 HC RX MED GY IP 250 OP 250 PS 637: Performed by: FAMILY MEDICINE

## 2023-11-16 PROCEDURE — 120N000001 HC R&B MED SURG/OB

## 2023-11-16 RX ORDER — MORPHINE SULFATE 10 MG/ML
10 INJECTION, SOLUTION INTRAMUSCULAR; INTRAVENOUS
Status: DISCONTINUED | OUTPATIENT
Start: 2023-11-16 | End: 2023-11-22 | Stop reason: HOSPADM

## 2023-11-16 RX ORDER — HYDROXYZINE HYDROCHLORIDE 50 MG/1
50 TABLET, FILM COATED ORAL
Status: DISCONTINUED | OUTPATIENT
Start: 2023-11-16 | End: 2023-11-16

## 2023-11-16 RX ADMIN — METHYLCELLULOSE 500 MG: 500 TABLET ORAL at 08:46

## 2023-11-16 RX ADMIN — HYDROXYZINE HYDROCHLORIDE 50 MG: 50 TABLET, FILM COATED ORAL at 20:47

## 2023-11-16 ASSESSMENT — ACTIVITIES OF DAILY LIVING (ADL)
ADLS_ACUITY_SCORE: 18

## 2023-11-16 NOTE — PLAN OF CARE
Problem: Adult Inpatient Plan of Care  Goal: Plan of Care Review  Outcome: Progressing   Goal Outcome Evaluation:  Patient stated feeling comfortable and working remote throughout the day. VSS and afebrile. NSTs reactive. Patient stated not feeling contractions too often, maybe once an hour or so. Patient aware to notify nurse with any changes. Will continue to monitor.

## 2023-11-16 NOTE — PLAN OF CARE
Category 1 fetal tracing. Reactive strip q4hrs when sleeping. Patient denies feeling contractions or cramping. No leaking of fluid or vaginal bleeding. Sleep meds given and patient able to rest through the night.     Problem: Labor  Goal: Stable Fetal Wellbeing  Outcome: Progressing  Intervention: Promote and Monitor Fetal Wellbeing  Recent Flowsheet Documentation  Taken 11/16/2023 0545 by Betty Kumar, RN  Body Position: position changed independently  Taken 11/16/2023 0133 by Betty Kumar, RN  Body Position: position changed independently  Goal: Absence of Infection Signs and Symptoms  Outcome: Progressing  Goal: Acceptable Pain Control  Outcome: Progressing   Goal Outcome Evaluation:

## 2023-11-16 NOTE — PROGRESS NOTES
CLINICAL NUTRITION SERVICES    Reviewed nutrition risk factors due to LOS.   Spoke with patient by phone.  Patient is eating regular meals, orders from nutrition services but also has food brought in.  No nutrition issues identified at this time. RD will follow via rounds at this time, unless consulted.

## 2023-11-17 LAB
ABO/RH(D): NORMAL
ANTIBODY SCREEN: NEGATIVE
BLD PROD TYP BPU: NORMAL
BLD PROD TYP BPU: NORMAL
BLOOD COMPONENT TYPE: NORMAL
BLOOD COMPONENT TYPE: NORMAL
CODING SYSTEM: NORMAL
CODING SYSTEM: NORMAL
CROSSMATCH: NORMAL
CROSSMATCH: NORMAL
ERYTHROCYTE [DISTWIDTH] IN BLOOD BY AUTOMATED COUNT: 13.2 % (ref 10–15)
HCT VFR BLD AUTO: 33.7 % (ref 35–47)
HGB BLD-MCNC: 11.4 G/DL (ref 11.7–15.7)
MCH RBC QN AUTO: 30.7 PG (ref 26.5–33)
MCHC RBC AUTO-ENTMCNC: 33.8 G/DL (ref 31.5–36.5)
MCV RBC AUTO: 91 FL (ref 78–100)
PLATELET # BLD AUTO: 186 10E3/UL (ref 150–450)
RBC # BLD AUTO: 3.71 10E6/UL (ref 3.8–5.2)
SPECIMEN EXPIRATION DATE: NORMAL
UNIT ABO/RH: NORMAL
UNIT ABO/RH: NORMAL
UNIT NUMBER: NORMAL
UNIT NUMBER: NORMAL
UNIT STATUS: NORMAL
UNIT STATUS: NORMAL
UNIT TYPE ISBT: 6200
UNIT TYPE ISBT: 6200
WBC # BLD AUTO: 9.4 10E3/UL (ref 4–11)

## 2023-11-17 PROCEDURE — 250N000013 HC RX MED GY IP 250 OP 250 PS 637: Performed by: FAMILY MEDICINE

## 2023-11-17 PROCEDURE — 36415 COLL VENOUS BLD VENIPUNCTURE: CPT | Performed by: FAMILY MEDICINE

## 2023-11-17 PROCEDURE — 86922 COMPATIBILITY TEST ANTIGLOB: CPT | Performed by: FAMILY MEDICINE

## 2023-11-17 PROCEDURE — 85014 HEMATOCRIT: CPT | Performed by: FAMILY MEDICINE

## 2023-11-17 PROCEDURE — 86850 RBC ANTIBODY SCREEN: CPT | Performed by: FAMILY MEDICINE

## 2023-11-17 PROCEDURE — 250N000011 HC RX IP 250 OP 636: Performed by: FAMILY MEDICINE

## 2023-11-17 PROCEDURE — 86901 BLOOD TYPING SEROLOGIC RH(D): CPT | Performed by: FAMILY MEDICINE

## 2023-11-17 PROCEDURE — G0008 ADMIN INFLUENZA VIRUS VAC: HCPCS | Performed by: FAMILY MEDICINE

## 2023-11-17 PROCEDURE — 120N000001 HC R&B MED SURG/OB

## 2023-11-17 PROCEDURE — 90686 IIV4 VACC NO PRSV 0.5 ML IM: CPT | Performed by: FAMILY MEDICINE

## 2023-11-17 RX ORDER — SODIUM CHLORIDE, SODIUM LACTATE, POTASSIUM CHLORIDE, CALCIUM CHLORIDE 600; 310; 30; 20 MG/100ML; MG/100ML; MG/100ML; MG/100ML
INJECTION, SOLUTION INTRAVENOUS CONTINUOUS PRN
Status: DISCONTINUED | OUTPATIENT
Start: 2023-11-17 | End: 2023-11-23 | Stop reason: HOSPADM

## 2023-11-17 RX ADMIN — HYDROXYZINE HYDROCHLORIDE 50 MG: 50 TABLET, FILM COATED ORAL at 21:27

## 2023-11-17 RX ADMIN — INFLUENZA A VIRUS A/VICTORIA/4897/2022 IVR-238 (H1N1) ANTIGEN (FORMALDEHYDE INACTIVATED), INFLUENZA A VIRUS A/DARWIN/9/2021 SAN-010 (H3N2) ANTIGEN (FORMALDEHYDE INACTIVATED), INFLUENZA B VIRUS B/PHUKET/3073/2013 ANTIGEN (FORMALDEHYDE INACTIVATED), AND INFLUENZA B VIRUS B/MICHIGAN/01/2021 ANTIGEN (FORMALDEHYDE INACTIVATED) 0.5 ML: 15; 15; 15; 15 INJECTION, SUSPENSION INTRAMUSCULAR at 20:04

## 2023-11-17 RX ADMIN — METHYLCELLULOSE 500 MG: 500 TABLET ORAL at 09:31

## 2023-11-17 ASSESSMENT — ACTIVITIES OF DAILY LIVING (ADL)
ADLS_ACUITY_SCORE: 18

## 2023-11-17 NOTE — PROGRESS NOTES
Dina feeling well, no new concerns.  Denies any signs or symptoms of  labor.  Will continue to monitor and report any new symptoms or concerns.

## 2023-11-17 NOTE — PROGRESS NOTES
"OB PROGRESS NOTE      IUP at 34w0d, admitted for spontaneous dilation to 5 cm and PTL with dilation to 6-7 cm.        SUBJECTIVE:  Patient feels well.  No contractions overnight.  More pelvic pressure with walking.  No leakage of fluid.  No fevers/chills. Reviewed goals of care.  Discussed option for augmentation of her dysfunctional  labor vs. Expectant management.      OBJECTIVE:  /73 (BP Location: Right arm, Patient Position: Semi-Stearns's, Cuff Size: Adult Regular)   Pulse 72   Temp 98.1  F (36.7  C) (Oral)   Resp 18   Ht 1.753 m (5' 9\")   Wt 74.8 kg (165 lb)   LMP 2023 (Approximate)   SpO2 98%   BMI 24.37 kg/m     GEN: A+ O x 3 NAD  NST: Category 1 reactive.  Cervix: 6-7 cm/80%/0-+1station  cervix is very soft membranes intact.      ASSESSMENT:  33 year old : 2 PTAL: 1001 at 33 w4d here with  labor, dysfunctional labor pattern.  Due to advanced dilation, unable to send home due to risk of precipitous delivery of  infant.    Membranes are intact.  We reviewed her goals of care after her discussion with MFM, reviewing risks and benefits of each.  We had discussed augmenting vs. Expectant management.  At this time, patient declines augmentation, but she would agree to proceed if further dilation, or 35-36 weeks.    Tianna Ellis MD  "

## 2023-11-17 NOTE — PROGRESS NOTES
Patient called RN into room and reported some blood tinged mucousy discharge when she went to the bathroom. She also stated that she has felt some mild cramps today. RN called Dr. Ellis and updated her on the patient's report. Dr. Ellis told RN that she performed an SVE today and the discharge could be from that. Will update Dr. Ellis if the patient starts nathaniel or if the patient reports increased bleeding or leaking of fluids.

## 2023-11-17 NOTE — PROGRESS NOTES
Dr. Ellis given telephone update - Patient doing well, feeling well, no changes.    Ok to switch to NST q 4 hours.

## 2023-11-17 NOTE — PLAN OF CARE
"  Problem: Labor  Goal: Stable Fetal Wellbeing  Intervention: Promote and Monitor Fetal Wellbeing  Recent Flowsheet Documentation  Taken 11/17/2023 0842 by Arpita Hernandez, RN  Body Position: position changed independently  Fetal Wellbeing Promotion:   fetal heart rate monitored   uterine contraction activity assessed   Goal Outcome Evaluation:      Plan of Care Reviewed With: patient    Overall Patient Progress: no changeOverall Patient Progress: no change    Outcome Evaluation: inpatient hospitalization for advanced dilation  Provider informed of non-reactive NST this morning. Continued fetal surveillance for evaluation. Encouraged oral hydration, repositioned and bladder emptied while out of bed on telemetry monitor. Cat I EFM tracing obtained prior to removal of monitors. Plan to continue NST evaluation as previously ordered.     Problem: Labor  Goal: Normal Uterine Contraction Pattern  Intervention: Monitor and Manage Uterine Activity  Recent Flowsheet Documentation  Taken 11/17/2023 0842 by Arpita Hernandez, RN  Fluid/Electrolyte Management: fluids provided  Irregular, mild uterine activity by palpation/toco earlier today that Dina described as \"tightening.\" Encouraged oral hydration. Reviewed signs of progressing labor for Dina to report to nursing staff.   "

## 2023-11-17 NOTE — PLAN OF CARE
Patient denies pain or contractions overnight. Cramping from earlier in the day no longer present. Reactive NST's. Patient afebrile.     Problem: Labor  Goal: Stable Fetal Wellbeing  Outcome: Progressing  Intervention: Promote and Monitor Fetal Wellbeing  Recent Flowsheet Documentation  Taken 11/17/2023 0000 by Betty Kumar, RN  Body Position: position changed independently  Taken 11/16/2023 2004 by Betty Kumar, RN  Body Position: position changed independently  Goal: Absence of Infection Signs and Symptoms  Outcome: Progressing  Goal: Acceptable Pain Control  Outcome: Progressing   Goal Outcome Evaluation:

## 2023-11-17 NOTE — PROGRESS NOTES
"Late entry:  Dr Micheline Haskins informed of non-reactive NST with normal baseline, moderate variability, presence of accelerations and two isolated non-severe variable decelerations in the context of irregular uterine activity that palpates mild with soft uterine resting tone that are primarily <30 seconds in duration and Dina reports as \"tightening.\" Plan to continue fetal surveillance another hour and update provider. Dina agreeable with plan of care. She denies leaking of fluid or vaginal bleeding, shortness of breath, calf pain, chest pain, uterine tenderness, fever or chills this  morning but reported \"pink-tinged\" vaginal discharge last night. New peripads given and requested Dina to inform this writer of leaking fluids, vaginal bleeding or discharge, increasing or changing uterine activity. Dr Ellis called unit for update and informed of cat I EFM tracing at time of phone call with plan to remove continuous monitors at 1025. Indeterminate FHR at that time with very active fetus and presence of hiccups; range noted in flowsheet at that time. Frequent, brief low amplitude and mild strength contractions continue. Dr Ellis requested to review monitor tracing remotely when able from home. Encouraged oral hydration and bathroom privileges and again repositioned. Cat I EFM tracing obtained. Dr Ellis reviewed tracing past and present from home and agreed to return to every 2 hour NST monitoring at this time. Dina verbalizes understanding of plan of care.   "

## 2023-11-17 NOTE — PROGRESS NOTES
Maternal Postpartum Progress Note  [facility]  2023 11:34 AM     ____________________________________________________________  ____ASSESSMENT:  33 year old : 2 PTAL: 1001 at 34+1d here with  labor, dysfunctional labor pattern.  Due to advanced dilation, unable to send home due to risk of precipitous delivery of  infant.    Membranes are intact.  She discussed with PCP  augmenting vs. Expectant management.  At this time, patient declines augmentation, but she would agree to proceed if further dilation, or 35-36 weeks.  Will continue Q2h NST given episode this AM of variables. Monitor for signs of labor and update myself or Dr. Ellis    ____________________________________________________________________    Subjective:  IUP at 34w1d, admitted for spontaneous dilation to 5 cm and PTL with dilation to 6-7 cm.   Patient denies headache, dizziness, dyspnea, and leg pain.  Ambulating and eating. We discussed risks of blood clots. Not having any contractions this morning at 9 am when I evaluated her . Had some pink tinge discharge after check yesterday and still some today. She will update us if persists. Declined cervical exam. She is glad there is an end date. Interested in healing arts.   Labs daily for monitoring infection. Afebrile. WBC stable.   Hasn't been taking prenatal and advised to restart.     I was called back after leaving the unit of her NST with some intermittent variables. See below for review.     Objective:  Patient Vitals for the past 24 hrs:   BP Temp Temp src Pulse Resp SpO2   23 1254 122/66 98.3  F (36.8  C) Oral -- 16 --   23 0842 114/64 98.2  F (36.8  C) Oral -- 17 97 %   23 0406 94/47 98.2  F (36.8  C) Oral -- 18 --   23 0000 92/51 98.4  F (36.9  C) Oral -- 18 --   23 2004 131/73 98.1  F (36.7  C) Oral -- 18 --   23 1535 124/60 98.2  F (36.8  C) Oral 72 16 98 %     General: Alert,  comfortable.     Ext: trace edema, no calf tenderness.    NST: Category 1 reactive.140s mod variability  no decel (resolved variables)   Cervix: 6-7 cm/80%/0-+1station  cervix is very soft membranes intact.  Recent Results (from the past 24 hour(s))   CBC with platelets    Collection Time: 11/17/23  5:17 AM   Result Value Ref Range    WBC Count 9.4 4.0 - 11.0 10e3/uL    RBC Count 3.71 (L) 3.80 - 5.20 10e6/uL    Hemoglobin 11.4 (L) 11.7 - 15.7 g/dL    Hematocrit 33.7 (L) 35.0 - 47.0 %    MCV 91 78 - 100 fL    MCH 30.7 26.5 - 33.0 pg    MCHC 33.8 31.5 - 36.5 g/dL    RDW 13.2 10.0 - 15.0 %    Platelet Count 186 150 - 450 10e3/uL   Adult Type and Screen    Collection Time: 11/17/23  5:17 AM   Result Value Ref Range    ABO/RH(D) A POS     Antibody Screen Negative Negative    SPECIMEN EXPIRATION DATE [id number]    Prepare red blood cells (unit)    Collection Time: 11/17/23 11:33 AM   Result Value Ref Range    Blood Component Type Red Blood Cells     Product Code T5096E13     Unit Status Ready for issue     Unit Number [id number]     CROSSMATCH COMPATIBLE     CODING SYSTEM [id number]    Prepare red blood cells (unit)    Collection Time: 11/17/23 11:33 AM   Result Value Ref Range    Blood Component Type Red Blood Cells     Product Code Q6207N17     Unit Status Ready for issue     Unit Number [id number]     CROSSMATCH COMPATIBLE     CODING SYSTEM [id number]         Completed by:   Micheline Haskins DO  [facility]  11/17/2023 11:34 AM

## 2023-11-18 LAB
ERYTHROCYTE [DISTWIDTH] IN BLOOD BY AUTOMATED COUNT: 13.1 % (ref 10–15)
HCT VFR BLD AUTO: 34 % (ref 35–47)
HGB BLD-MCNC: 11.6 G/DL (ref 11.7–15.7)
HOLD SPECIMEN: NORMAL
MCH RBC QN AUTO: 30.8 PG (ref 26.5–33)
MCHC RBC AUTO-ENTMCNC: 34.1 G/DL (ref 31.5–36.5)
MCV RBC AUTO: 90 FL (ref 78–100)
PLATELET # BLD AUTO: 207 10E3/UL (ref 150–450)
RBC # BLD AUTO: 3.77 10E6/UL (ref 3.8–5.2)
WBC # BLD AUTO: 9.1 10E3/UL (ref 4–11)

## 2023-11-18 PROCEDURE — 120N000001 HC R&B MED SURG/OB

## 2023-11-18 PROCEDURE — 85027 COMPLETE CBC AUTOMATED: CPT | Performed by: FAMILY MEDICINE

## 2023-11-18 PROCEDURE — 36415 COLL VENOUS BLD VENIPUNCTURE: CPT | Performed by: FAMILY MEDICINE

## 2023-11-18 PROCEDURE — 250N000013 HC RX MED GY IP 250 OP 250 PS 637: Performed by: FAMILY MEDICINE

## 2023-11-18 RX ADMIN — HYDROXYZINE HYDROCHLORIDE 50 MG: 50 TABLET, FILM COATED ORAL at 21:32

## 2023-11-18 RX ADMIN — METHYLCELLULOSE 500 MG: 500 TABLET ORAL at 09:58

## 2023-11-18 ASSESSMENT — ACTIVITIES OF DAILY LIVING (ADL)
ADLS_ACUITY_SCORE: 18

## 2023-11-18 NOTE — PROGRESS NOTES
Dina is feeling well.  Has noticed one contractions since she woke this morning.  Will continue to monitor.

## 2023-11-18 NOTE — PROGRESS NOTES
Data: Today is hospital day 10 after admission on  for  labor. Dina reports she is feeling well.  Denies any contractions or cramping.  Reports intermittent periods of contractions or cramping over last 48 hours but nothing regular or sustained.  Reports an increase in mucous discharge over past 48 hours.  Physical assessment WDL. Psyche is good.   and toddler here this morning for a visit. Fetal assessment - reactive non-stress test this morning with an active fetus.   Action: Continue with plan of care, which is non-stress tests every 4 hours. Patient verbalizes understanding that she should notify nursing staff with any change in  labor signs or symptoms for increased surveillance.  Response:  Dr. Haskins at the bedside this morning for rounds.  Reviewed symptoms over past 48 hours.  SVE by Dr. Haskins .  No change in plan of care at this time.

## 2023-11-18 NOTE — PROGRESS NOTES
Maternal Postpartum Progress Note  [facility]  2023 8:59 AM     ________________________________________________________________________    Assessment and Plan  :    33 year old : 2 PTAL: 1001 at 34+2d here with  labor, dysfunctional labor pattern.  Due to advanced dilation, unable to send home due to risk of precipitous delivery of  infant.    Membranes are intact.  She discussed with PCP  augmenting vs. Expectant management.  At this time, patient declines augmentation, but she would agree to proceed if further dilation, or 35-36 weeks.  Will continue Q4h NST  Monitor for signs of labor and update myself or    Daily labs , will check with Dr. Ellis if ok to making every 3 days. Afebrile.   Resume GBS prophylaxis when in labor rather than recheck at this time   _______________________________________________    Subjective:   IUP at 34w2d, admitted for spontaneous dilation to 5 cm and PTL with dilation to 6-7 cm.   Patient denies headache, dizziness, dyspnea, and leg pain. Negin and  coming to visit. Still with discharge   Ambulating and eating. We discussed risks of blood clots.    Labs daily for monitoring infection. Afebrile. WBC stable.     NST at 5:00am.     Category 1 tracing. 125 mod variability with accel and no decel.  Cervix 7cm on exam80%.   Objective:  Patient Vitals for the past 24 hrs:   BP Temp Temp src Resp SpO2   23 0457 103/55 98.1  F (36.7  C) Oral 16 96 %   23 0128 92/54 98.2  F (36.8  C) Oral 16 96 %   23 2110 -- 98.3  F (36.8  C) Oral 16 96 %   23 1550 112/61 98.5  F (36.9  C) Oral 16 --   23 1254 122/66 98.3  F (36.8  C) Oral 16 --     General: Alert, comfortable.  Heart: RRR, no murmur.  Lungs: CTA bilaterally.  Abdomen: non-distended. Uterine fundus firm, below umbilicus.  Ext: trace edema, no calf tenderness.  Recent Results (from the past 24 hour(s))   Prepare red blood cells  (unit)    Collection Time: 11/17/23 11:33 AM   Result Value Ref Range    Blood Component Type Red Blood Cells     Product Code K1151K98     Unit Status Ready for issue     Unit Number [id number]     CROSSMATCH COMPATIBLE     CODING SYSTEM [id number]    Prepare red blood cells (unit)    Collection Time: 11/17/23 11:33 AM   Result Value Ref Range    Blood Component Type Red Blood Cells     Product Code E1403E67     Unit Status Ready for issue     Unit Number [id number]     CROSSMATCH COMPATIBLE     CODING SYSTEM [id number]    CBC with platelets    Collection Time: 11/18/23  4:53 AM   Result Value Ref Range    WBC Count 9.1 4.0 - 11.0 10e3/uL    RBC Count 3.77 (L) 3.80 - 5.20 10e6/uL    Hemoglobin 11.6 (L) 11.7 - 15.7 g/dL    Hematocrit 34.0 (L) 35.0 - 47.0 %    MCV 90 78 - 100 fL    MCH 30.8 26.5 - 33.0 pg    MCHC 34.1 31.5 - 36.5 g/dL    RDW 13.1 10.0 - 15.0 %    Platelet Count 207 150 - 450 10e3/uL        Completed by:   Micheline Haskins DO  [facility]  11/18/2023 8:59 AM   used: Not needed.

## 2023-11-18 NOTE — PROGRESS NOTES
NST complete.  Category 1 fetal heart tracing. Dina states she has been sleeping well and denies pain.  Plan to see her again in 4 hours unless she calls me before then.

## 2023-11-18 NOTE — PLAN OF CARE
Problem: Labor  Goal: Stable Fetal Wellbeing  11/18/2023 0612 by Re Hobbs RN  Outcome: Progressing  11/17/2023 2250 by Re Hobbs RN  Outcome: Progressing   Goal Outcome Evaluation:         NSTs through the night have been category 1.  Problem: Labor  Goal: Acceptable Pain Control  11/18/2023 0612 by Re Hobbs RN  Outcome: Progressing  11/17/2023 2250 by Re Hobbs RN  Outcome: Progressing  Intervention: Support Labor Pain Coping and Management  Recent Flowsheet Documentation  Taken 11/18/2023 0500 by Re Hobbs RN  Sensory Stimulation Regulation: care macario       Dina denies pain at this time.

## 2023-11-18 NOTE — PLAN OF CARE
"  Problem: Adult Inpatient Plan of Care  Goal: Plan of Care Review  Description: The Plan of Care Review/Shift note should be completed every shift.  The Outcome Evaluation is a brief statement about your assessment that the patient is improving, declining, or no change.  This information will be displayed automatically on your shift  note.  Outcome: Progressing   Goal Outcome Evaluation:       Plan of care reviewed with patient.  Writer and patient agree on plan of care for the night.   Problem: Adult Inpatient Plan of Care  Goal: Patient-Specific Goal (Individualized)  Description: You can add care plan individualizations to a care plan. Examples of Individualization might be:  \"Parent requests to be called daily at 9am for status\", \"I have a hard time hearing out of my right ear\", or \"Do not touch me to wake me up as it startles  me\".  Outcome: Progressing     Dina will call with any needs.    Problem: Adult Inpatient Plan of Care  Goal: Optimal Comfort and Wellbeing  Outcome: Progressing  Intervention: Provide Person-Centered Care  Recent Flowsheet Documentation  Taken 11/17/2023 2110 by Re Hobbs, RN  Trust Relationship/Rapport:   care explained   choices provided   emotional support provided   empathic listening provided   questions answered   questions encouraged   reassurance provided   thoughts/feelings acknowledged       Also, sleep environment discussed and Dina feels she has everything she needs for restful sleep.            "

## 2023-11-18 NOTE — PLAN OF CARE
Goal Outcome Evaluation:      Problem:  Labor  Goal: Delayed  Delivery  Outcome: Progressing  Intervention: Monitor and Manage  Labor  Recent Flowsheet Documentation  Taken 2023 0945 by Eulalia Barry, RN  Body Position: position changed independently     Continue to monitor for signs and symptoms of labor progression.  NST q4h.

## 2023-11-18 NOTE — PROGRESS NOTES
1905:  Bedside report received from Ladi Barry RNC.  Writer stays at the bedside discussing Dina's plan for this labor and delivery and plan of care for the night for another 30 minutes.  Plan to EFM with vital signs around 2100 and bring in saline flush and fluzone around 2000. Dina agrees with plan of care.

## 2023-11-19 LAB
ERYTHROCYTE [DISTWIDTH] IN BLOOD BY AUTOMATED COUNT: 13.1 % (ref 10–15)
HCT VFR BLD AUTO: 33.4 % (ref 35–47)
HGB BLD-MCNC: 11.4 G/DL (ref 11.7–15.7)
MCH RBC QN AUTO: 30.6 PG (ref 26.5–33)
MCHC RBC AUTO-ENTMCNC: 34.1 G/DL (ref 31.5–36.5)
MCV RBC AUTO: 90 FL (ref 78–100)
PLATELET # BLD AUTO: 203 10E3/UL (ref 150–450)
RBC # BLD AUTO: 3.72 10E6/UL (ref 3.8–5.2)
WBC # BLD AUTO: 8.2 10E3/UL (ref 4–11)

## 2023-11-19 PROCEDURE — 120N000001 HC R&B MED SURG/OB

## 2023-11-19 PROCEDURE — 85027 COMPLETE CBC AUTOMATED: CPT | Performed by: FAMILY MEDICINE

## 2023-11-19 PROCEDURE — 250N000013 HC RX MED GY IP 250 OP 250 PS 637: Performed by: FAMILY MEDICINE

## 2023-11-19 PROCEDURE — 36415 COLL VENOUS BLD VENIPUNCTURE: CPT | Performed by: FAMILY MEDICINE

## 2023-11-19 RX ADMIN — HYDROXYZINE HYDROCHLORIDE 50 MG: 50 TABLET, FILM COATED ORAL at 21:10

## 2023-11-19 RX ADMIN — METHYLCELLULOSE 500 MG: 500 TABLET ORAL at 08:59

## 2023-11-19 ASSESSMENT — ACTIVITIES OF DAILY LIVING (ADL)
ADLS_ACUITY_SCORE: 18

## 2023-11-19 NOTE — PLAN OF CARE
Problem:  Labor  Goal: Delayed  Delivery  2023 1830 by Eulalia Barry RN  Outcome: Progressing  2023 1031 by Eulalia Barry RN  Outcome: Progressing  Intervention: Monitor and Manage  Labor  Recent Flowsheet Documentation  Taken 2023 1705 by Eulalia Barry RN  Body Position: position changed independently  Taken 2023 0945 by Eulaila Barry RN  Body Position: position changed independently   Goal Outcome Evaluation:    Continue to monitor for increased symptoms of  labor.  Patient will need GBS prophylaxis prior to delivery.

## 2023-11-19 NOTE — PROGRESS NOTES
Telephone update given to Dr. Ellis:    Dina doing well this evening.  Reports feeling some new sharp pains in her vagina over the last 30 minutes.  It feels different than what she would describe as the labor that brought her in.    Recommended for RN to do SVE.  Dina and Akira are in agreement with plan.  6-7/80/0.  Team is reassured by exam.  Will continue to monitor.

## 2023-11-19 NOTE — PROGRESS NOTES
Dr. Haskins in department to see Dina.  Reviewed Non-stress tests.  Patient continues to feel well.  Reported about two contractions an hour in the evening last night, which she reports is usual for her and when uterus is most active.  Otherwise she is feeling well and has no further signs of  labor.

## 2023-11-19 NOTE — PROGRESS NOTES
Reactive non-stress test at 1728, reviewed by Mari Emerson, RNC.    Dina feeling well this evening.  She reports that over the past couple days she's felt more uterine activity in the evenings.  Writer was at the bedside for an hour chatting with Dina, and in that time the patient felt two contractions, both palpated mild and were talked through by dina.    Dina verbalizes understanding that she should notify nursing if she has any increase in uterine activity.

## 2023-11-19 NOTE — PROGRESS NOTES
Labor Progress Note  [facility]  Date of Admission: 2023  Date of Service: 2023    Assessment/Plan          33 year old : 2 PTAL: 1001 at 34+3d here with  labor, dysfunctional labor pattern.  Due to advanced dilation, unable to send home due to risk of precipitous delivery of  infant.    Membranes are intact.  She discussed with PCP  augmenting vs. Expectant management.  At this time, patient declines augmentation, but she would agree to proceed if further dilation, or 35-36 weeks. She is wanting to consider augment at 35 wks after holidays   Will continue Q4h  NST and sooner if feels contractions . Monitor for signs of labor and update myself or   Change CBC to Q 72 h with Type and screen    _______________________________________________     Subjective:   IUP at 34w3d, admitted for spontaneous dilation to 5 cm and PTL with dilation to 6-7 cm.   Patient denies headache, dizziness, dyspnea, and leg pain.  Ambulating and eating. Still having some intermittent contractions as she has at night 2 per hour.      NST     Category 1 tracing. 140 mod variability with accel and no decel.        Objective  Vital signs in last 24 hours  Temp:  [98  F (36.7  C)-98.4  F (36.9  C)] 98.4  F (36.9  C)  Resp:  [16] 16  BP: ()/(51-70) 108/61  SpO2:  [96 %-97 %] 97 %      Physical Exam   General: Alert, comfortable.  Heart: RRR, no murmur.  Lungs: CTA bilaterally.  Abdomen: non-distended. Uterine fundus firm, below umbilicus.  Cervix- yesterday's exam  7+, 80% mid to post, 0-1+ station   Ext: trace edema, no calf tenderness.    Recent Results (from the past 24 hour(s))   CBC with platelets    Collection Time: 23  7:01 AM   Result Value Ref Range    WBC Count 8.2 4.0 - 11.0 10e3/uL    RBC Count 3.72 (L) 3.80 - 5.20 10e6/uL    Hemoglobin 11.4 (L) 11.7 - 15.7 g/dL    Hematocrit 33.4 (L) 35.0 - 47.0 %    MCV 90 78 - 100 fL    MCH 30.6 26.5 - 33.0  pg    MCHC 34.1 31.5 - 36.5 g/dL    RDW 13.1 10.0 - 15.0 %    Platelet Count 203 150 - 450 10e3/uL      Micheline Haskins DO   [facility]

## 2023-11-20 PROCEDURE — 120N000001 HC R&B MED SURG/OB

## 2023-11-20 PROCEDURE — 250N000013 HC RX MED GY IP 250 OP 250 PS 637: Performed by: FAMILY MEDICINE

## 2023-11-20 RX ORDER — VALACYCLOVIR HYDROCHLORIDE 500 MG/1
500 TABLET, FILM COATED ORAL DAILY
Status: DISCONTINUED | OUTPATIENT
Start: 2023-11-20 | End: 2023-11-23 | Stop reason: HOSPADM

## 2023-11-20 RX ADMIN — METHYLCELLULOSE 500 MG: 500 TABLET ORAL at 09:04

## 2023-11-20 RX ADMIN — VALACYCLOVIR HYDROCHLORIDE 500 MG: 500 TABLET, FILM COATED ORAL at 09:49

## 2023-11-20 RX ADMIN — HYDROXYZINE HYDROCHLORIDE 50 MG: 50 TABLET, FILM COATED ORAL at 22:49

## 2023-11-20 ASSESSMENT — ACTIVITIES OF DAILY LIVING (ADL)
ADLS_ACUITY_SCORE: 18

## 2023-11-20 NOTE — PROVIDER NOTIFICATION
Patient has been on the monitor for about 1.5 hours.  She is having contractions about every 5-20 minutes, with high frequency low amplitude uterine activity between that feels crampy to patient.  Dina rates the contraction pain as about 2-4/10.  She otherwise appears comfortable.  FHR has been cat 1 with accels.     Dr. Ellis given update.  Ok to discontinue continuous monitoring and resume NSTs every 4 hours unless symptoms change.

## 2023-11-20 NOTE — PROGRESS NOTES
Labor Progress Note  [facility]  Date of Admission: 2023  Date of Service: 2023    Assessment/Plan          33 year old : 2 PTAL: 1001 at 34+4d here with  labor, dysfunctional labor pattern.  Due to advanced dilation, unable to send home due to risk of precipitous delivery of  infant.    Membranes are intact.  Discussed  augmenting vs. Expectant management to avoid  delivery.  Patient would like augmentation after 35 weeks assuming that she does not progress in labor and membranes remain intact.  Patient has received her full course of steroids when she was active and dilating at 33w2d.  Plan to augment her dysfunctional labor on 2023.  Plan for BPP on 2023  GBS prophylaxis: will restart when active and dilating.    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   _______________________________________________     Subjective:   IUP at 34w4d, admitted for spontaneous dilation to 5 cm and PTL with dilation to 6-7 cm.   Patient denies headache, dizziness, dyspnea, and leg pain.  Ambulating and eating normally.  Had an episode last night of increased contractions once every 10 minutes.  Normal fetal strip.  No change in cervix.  Afebrile.     NST     Category 1 tracing. 140 mod variability with accel and no decel.  Reactive strip.        Objective  Vital signs in last 24 hours  Temp:  [98  F (36.7  C)-98.6  F (37  C)] 98  F (36.7  C)  Resp:  [16-18] 16  BP: ()/(48-67) 96/52  SpO2:  [96 %-98 %] 96 %      Physical Exam   General: Alert, comfortable.  Heart: RRR, no murmur.  Lungs: CTA bilaterally.  Abdomen: non-distended. Uterine fundus firm, below umbilicus.  Cervix- yesterday's exam  6-7/ 80% mid to post, 0-1+ station per RN cervix check last night.  Ext: trace edema, no calf tenderness.       Micheline Haskins, DO   [facility]

## 2023-11-20 NOTE — PROGRESS NOTES
Dr. Ellis at bedside at 0815, discussed plan of care with pt. Plan is to induce labor on Monday 11/27/23, pt will be 35.4 at that time.    Vss, afebrile, monitors applied for NST. No complaints at this time.

## 2023-11-20 NOTE — PLAN OF CARE
"  Problem: Adult Inpatient Plan of Care  Goal: Plan of Care Review  Description: The Plan of Care Review/Shift note should be completed every shift.  The Outcome Evaluation is a brief statement about your assessment that the patient is improving, declining, or no change.  This information will be displayed automatically on your shift  note.  Outcome: Progressing   Goal Outcome Evaluation:       Plan of care:  Dina having more contractions this evening but states they \"don't feel much different than the ones I've been having\".  Writer offers vistaril for sleep and Dina accepts.  Dina instructed to call if not sleeping or if she feels a change in her contractions (more frequent, more painful), SROM, increased pressure or with any other needs.  Dina verbalizes understanding of instructions.    Problem: Adult Inpatient Plan of Care  Goal: Optimal Comfort and Wellbeing  Outcome: Progressing   Dina states she has everything she needs for her sleeping environment.  Problem: Labor  Goal: Stable Fetal Wellbeing  Outcome: Progressing  Intervention: Promote and Monitor Fetal Wellbeing  Recent Flowsheet Documentation  Taken 11/19/2023 2120 by Re Hobbs RN  Fetal Wellbeing Promotion: fetal heart rate monitored   Fetal monitoring completed for evening shift.                  "

## 2023-11-21 LAB
ABO/RH(D): NORMAL
ANTIBODY SCREEN: NEGATIVE
ERYTHROCYTE [DISTWIDTH] IN BLOOD BY AUTOMATED COUNT: 13.1 % (ref 10–15)
HCT VFR BLD AUTO: 35.2 % (ref 35–47)
HGB BLD-MCNC: 12.2 G/DL (ref 11.7–15.7)
HOLD SPECIMEN: NORMAL
MCH RBC QN AUTO: 31 PG (ref 26.5–33)
MCHC RBC AUTO-ENTMCNC: 34.7 G/DL (ref 31.5–36.5)
MCV RBC AUTO: 90 FL (ref 78–100)
PLATELET # BLD AUTO: 192 10E3/UL (ref 150–450)
RBC # BLD AUTO: 3.93 10E6/UL (ref 3.8–5.2)
SPECIMEN EXPIRATION DATE: NORMAL
WBC # BLD AUTO: 10.6 10E3/UL (ref 4–11)

## 2023-11-21 PROCEDURE — 120N000001 HC R&B MED SURG/OB

## 2023-11-21 PROCEDURE — 85027 COMPLETE CBC AUTOMATED: CPT | Performed by: FAMILY MEDICINE

## 2023-11-21 PROCEDURE — 86901 BLOOD TYPING SEROLOGIC RH(D): CPT | Performed by: FAMILY MEDICINE

## 2023-11-21 PROCEDURE — 258N000003 HC RX IP 258 OP 636: Performed by: FAMILY MEDICINE

## 2023-11-21 PROCEDURE — 36415 COLL VENOUS BLD VENIPUNCTURE: CPT | Performed by: FAMILY MEDICINE

## 2023-11-21 PROCEDURE — 86850 RBC ANTIBODY SCREEN: CPT | Performed by: FAMILY MEDICINE

## 2023-11-21 PROCEDURE — 250N000013 HC RX MED GY IP 250 OP 250 PS 637: Performed by: FAMILY MEDICINE

## 2023-11-21 PROCEDURE — 250N000011 HC RX IP 250 OP 636: Performed by: FAMILY MEDICINE

## 2023-11-21 RX ORDER — PENICILLIN G POTASSIUM 5000000 [IU]/1
5 INJECTION, POWDER, FOR SOLUTION INTRAMUSCULAR; INTRAVENOUS ONCE
Qty: 5 MILLION UNITS | Refills: 0 | Status: COMPLETED | OUTPATIENT
Start: 2023-11-21 | End: 2023-11-21

## 2023-11-21 RX ORDER — PENICILLIN G 3000000 [IU]/50ML
3 INJECTION, SOLUTION INTRAVENOUS EVERY 4 HOURS
Status: DISCONTINUED | OUTPATIENT
Start: 2023-11-22 | End: 2023-11-22 | Stop reason: HOSPADM

## 2023-11-21 RX ADMIN — METHYLCELLULOSE 500 MG: 500 TABLET ORAL at 08:56

## 2023-11-21 RX ADMIN — VALACYCLOVIR HYDROCHLORIDE 500 MG: 500 TABLET, FILM COATED ORAL at 08:56

## 2023-11-21 RX ADMIN — SODIUM CHLORIDE, POTASSIUM CHLORIDE, SODIUM LACTATE AND CALCIUM CHLORIDE: 600; 310; 30; 20 INJECTION, SOLUTION INTRAVENOUS at 22:15

## 2023-11-21 RX ADMIN — PENICILLIN G POTASSIUM 5 MILLION UNITS: 5000000 POWDER, FOR SOLUTION INTRAMUSCULAR; INTRAPLEURAL; INTRATHECAL; INTRAVENOUS at 22:15

## 2023-11-21 ASSESSMENT — ACTIVITIES OF DAILY LIVING (ADL)
ADLS_ACUITY_SCORE: 18

## 2023-11-21 NOTE — PROGRESS NOTES
Patient sleeping well. Initial BP 87/46, patient asymptomatic. Retake 92/55. SL flushed. Assessment WNL. Category 1 FHR tracing, reviewed by Diane Cuevas RN. Patient had 1 contraction that she did not feel.

## 2023-11-21 NOTE — PLAN OF CARE
"Assumed patient care at 5681. 1020 NST reactive and reviewed by Yolanda Pittman RN. Patient reports feeling contractions that she \"usually feels at this time of day\", rates them 1-2/10, and they palpate mild.     "

## 2023-11-21 NOTE — PROGRESS NOTES
Labor Progress Note  [facility]  Date of Admission: 2023  Date of Service: 2023    Assessment/Plan          33 year old : 2 PTAL: 1001 at 34+4d here with  labor, dysfunctional labor pattern.  Due to advanced dilation, unable to send home due to risk of precipitous delivery of  infant.    Membranes are intact.  Discussed  augmenting vs. Expectant management to avoid  delivery.  Patient would like augmentation after 35 weeks assuming that she does not progress in labor and membranes remain intact.  Patient has received her full course of steroids when she was active and dilating at 33w2d.  Plan to augment her dysfunctional labor on 2023.  Plan for BPP on 2023  GBS prophylaxis: will restart when active and dilating.    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   _______________________________________________     Subjective:   IUP at 34w4d, admitted for spontaneous dilation to 5 cm and PTL with dilation to 6-7 cm.   Patient denies headache, dizziness, dyspnea, and leg pain.  Ambulating and eating normally.  Continues to have some contractions late afternoon that resolve spontaneously.  Normal fetal strip.  No change in cervix.  Afebrile.     NST     Category 1 tracing. .        Objective  Vital signs in last 24 hours  Temp:  [98  F (36.7  C)-99  F (37.2  C)] 99  F (37.2  C)  Pulse:  [69-91] 91  Resp:  [16-18] 18  BP: ()/(46-68) 116/63  SpO2:  [97 %-98 %] 98 %      Physical Exam   General: Alert, comfortable.  Heart: RRR, no murmur.  Lungs: CTA bilaterally.  Abdomen: non-distended. Uterine fundus firm, below umbilicus.  Cervix- yesterday's exam  6-7/ 80% mid to post, 0-1+ station per RN cervix check last night.  Ext: trace edema, no calf tenderness.       Rebecca Bellamy MD  [facility]

## 2023-11-21 NOTE — PLAN OF CARE
Problem: Adult Inpatient Plan of Care  Goal: Plan of Care Review  Outcome: Progressing   Goal Outcome Evaluation:  VSS and afebrile. NST reactive this evening. Patient stated feeling irregular contractions here and there. Denies LOF. Patient's daughter visiting this evening, in good spirits. Patient aware to call with any  symptoms. Will continue to monitor.

## 2023-11-21 NOTE — CARE PLAN
0600 NST reactive. Baseline 130, moderatet variability, accels present, decel absent.  Strip reviewed with Katheryn JEROME RN

## 2023-11-22 LAB — HGB BLD-MCNC: 10.6 G/DL (ref 11.7–15.7)

## 2023-11-22 PROCEDURE — 250N000013 HC RX MED GY IP 250 OP 250 PS 637: Performed by: FAMILY MEDICINE

## 2023-11-22 PROCEDURE — 0HQ9XZZ REPAIR PERINEUM SKIN, EXTERNAL APPROACH: ICD-10-PCS | Performed by: FAMILY MEDICINE

## 2023-11-22 PROCEDURE — 250N000009 HC RX 250: Performed by: FAMILY MEDICINE

## 2023-11-22 PROCEDURE — 120N000001 HC R&B MED SURG/OB

## 2023-11-22 PROCEDURE — 722N000001 HC LABOR CARE VAGINAL DELIVERY SINGLE

## 2023-11-22 PROCEDURE — 250N000011 HC RX IP 250 OP 636: Mod: JZ

## 2023-11-22 PROCEDURE — 36415 COLL VENOUS BLD VENIPUNCTURE: CPT | Performed by: FAMILY MEDICINE

## 2023-11-22 PROCEDURE — 250N000011 HC RX IP 250 OP 636: Performed by: FAMILY MEDICINE

## 2023-11-22 PROCEDURE — 85018 HEMOGLOBIN: CPT | Performed by: FAMILY MEDICINE

## 2023-11-22 RX ORDER — DOCUSATE SODIUM 100 MG/1
100 CAPSULE, LIQUID FILLED ORAL DAILY
Status: DISCONTINUED | OUTPATIENT
Start: 2023-11-22 | End: 2023-11-23 | Stop reason: HOSPADM

## 2023-11-22 RX ORDER — IBUPROFEN 800 MG/1
800 TABLET, FILM COATED ORAL EVERY 6 HOURS PRN
Status: DISCONTINUED | OUTPATIENT
Start: 2023-11-22 | End: 2023-11-23 | Stop reason: HOSPADM

## 2023-11-22 RX ORDER — ACETAMINOPHEN 325 MG/1
650 TABLET ORAL EVERY 4 HOURS PRN
Status: DISCONTINUED | OUTPATIENT
Start: 2023-11-22 | End: 2023-11-23 | Stop reason: HOSPADM

## 2023-11-22 RX ORDER — KETOROLAC TROMETHAMINE 30 MG/ML
INJECTION, SOLUTION INTRAMUSCULAR; INTRAVENOUS
Status: COMPLETED
Start: 2023-11-22 | End: 2023-11-22

## 2023-11-22 RX ORDER — HYDROCORTISONE 25 MG/G
CREAM TOPICAL 3 TIMES DAILY PRN
Status: DISCONTINUED | OUTPATIENT
Start: 2023-11-22 | End: 2023-11-23 | Stop reason: HOSPADM

## 2023-11-22 RX ORDER — MODIFIED LANOLIN
OINTMENT (GRAM) TOPICAL
Status: DISCONTINUED | OUTPATIENT
Start: 2023-11-22 | End: 2023-11-23 | Stop reason: HOSPADM

## 2023-11-22 RX ORDER — BISACODYL 10 MG
10 SUPPOSITORY, RECTAL RECTAL DAILY PRN
Status: DISCONTINUED | OUTPATIENT
Start: 2023-11-22 | End: 2023-11-23 | Stop reason: HOSPADM

## 2023-11-22 RX ADMIN — DOCUSATE SODIUM 100 MG: 100 CAPSULE, LIQUID FILLED ORAL at 08:12

## 2023-11-22 RX ADMIN — VALACYCLOVIR HYDROCHLORIDE 500 MG: 500 TABLET, FILM COATED ORAL at 08:12

## 2023-11-22 RX ADMIN — IBUPROFEN 800 MG: 800 TABLET ORAL at 18:54

## 2023-11-22 RX ADMIN — ACETAMINOPHEN 650 MG: 325 TABLET ORAL at 17:11

## 2023-11-22 RX ADMIN — IBUPROFEN 800 MG: 800 TABLET ORAL at 12:08

## 2023-11-22 RX ADMIN — ACETAMINOPHEN 650 MG: 325 TABLET ORAL at 21:04

## 2023-11-22 RX ADMIN — BENZOCAINE AND LEVOMENTHOL 1 G: 200; 5 SPRAY TOPICAL at 06:21

## 2023-11-22 RX ADMIN — KETOROLAC TROMETHAMINE 30 MG: 30 INJECTION, SOLUTION INTRAMUSCULAR; INTRAVENOUS at 03:09

## 2023-11-22 RX ADMIN — Medication 340 ML/HR: at 02:28

## 2023-11-22 RX ADMIN — ACETAMINOPHEN 650 MG: 325 TABLET ORAL at 12:08

## 2023-11-22 RX ADMIN — FENTANYL CITRATE 50 MCG: 50 INJECTION, SOLUTION INTRAMUSCULAR; INTRAVENOUS at 02:49

## 2023-11-22 RX ADMIN — LIDOCAINE HYDROCHLORIDE 30 ML: 10 INJECTION, SOLUTION EPIDURAL; INFILTRATION; INTRACAUDAL; PERINEURAL at 02:30

## 2023-11-22 RX ADMIN — ACETAMINOPHEN 650 MG: 325 TABLET ORAL at 08:12

## 2023-11-22 ASSESSMENT — ACTIVITIES OF DAILY LIVING (ADL)
ADLS_ACUITY_SCORE: 18

## 2023-11-22 NOTE — PROVIDER NOTIFICATION
Updated Dr. Ellis SVE is unchanged, has bulging bag of water, contractions still every 2-4 minutes, category 1 tracing, contraction strength is unchanged, and patient also reports pelvic pain in pubis area and back that do not go away with contractions.    Plan: continue continuous EFM until Dr. Ellis updated by 2200. Patient agrees to plan and will let staff know of any changes to current status.

## 2023-11-22 NOTE — PROVIDER NOTIFICATION
"Updated Dr. Ellis regarding patient's status: writer called to patient room and patient stated her contractions were feeling stronger, now rating 5-6/10 and states she feels \"the strong ones every 5-8 minutes\". EFM monitors placed at 1953, tracing has minimal to moderate variability with no accels or decels. Contractions palpate mild-moderate, and are 2-4 minutes apart. Patient reports this has happened twice before and the \"contractions went away by themself\".     Plan: keep patient on EFM, check SVE in 30 minutes and call Dr. Ellis with an update. Patient agrees to plan.   "

## 2023-11-22 NOTE — PROVIDER NOTIFICATION
"Updated Dr. Trevizo of patients report of \"strong contractions have spaced out but still feeling cramping in between them\", tracing shows contractions 4-6 minutes apart that palpate moderate with 1-2 smaller contractions in between that patient confirms she's feeling. Patient also states some contractions she is needing to breathe through.    Plan: recheck SVE and update Dr. Ellis. Patient okay with plan.  "

## 2023-11-22 NOTE — PROGRESS NOTES
Labor Progress Note  [facility]  Date of Admission: 2023  Date of Service: 2023    Assessment/Plan  33 year old  at 34w5d gestational age admitted for  labor with dysfunctional labor pattern now with SROM and spontaneous labor.   1) GBS positive: Abx prophylaxis started  2) Contractions q2-4 minutes  3) Pain control: currently patient is interested in nitrous oxide.  4) NICU informed.        Subjective  Called tonight as patient starting having contractions at q2-3 min rated 4/10 in intensity.  SROM at  productive of clear fluid.  Cervix still at 6.5/80%/0station.  Contractions sponaneously q2-4 min.  Category 1 strip until SROM then min variability.  Fluid bolus and repositioning lead to moderate variability.    Objective  Vital signs in last 24 hours  Temp:  [98.3  F (36.8  C)-100  F (37.8  C)] 100  F (37.8  C)  Pulse:  [75-86] 86  Resp:  [16-20] 20  BP: ()/(46-77) 90/52  SpO2:  [97 %-100 %] 97 %      Physical Exam  General:   Abd: Gravid, soft, nontender  Cervix: 8cm, 80% effaced, 0 station  FHR: Baseline 150/mod variability/no accels/no decels  [place]: Contractions Q 2-3 min  Extremities: no peripheral edema      Tianna Ellis MD  [facility]

## 2023-11-22 NOTE — PROGRESS NOTES
Dina has been in the NICU since 1550 for her infant's feeding at 1600. Before patient left the unit, RN requested that patient put call light when she is back from NICU for a fundal assessment and vitals. Encouraged patient to come back within the hour if possible, as assessment is due at 1630.

## 2023-11-22 NOTE — PROGRESS NOTES
Data: Vital signs within normal limits, patient's BP runs soft. Postpartum checks within normal limits - see flow record. Bleeding light and stable. Patient eating and drinking normally. Patient able to empty bladder independently and is up ambulating. Stool softener given to promote bowel movements. Patient performing self cares, is visiting infant in the NICU often, and is pumping every 2-3 hours. Vaginal and labial lacerations on perineum. Using ice, medicated pads, medicated spray, and aziza bottle for discomfort. Frequent tub soaks encouraged to promote perineal healing.     Action: Patient medicated with ibuprofen and tylenol during the shift for pain. See MAR. Adequate pain control noted by patient. Patient education done, see flow record.    Response: Positive attachment behaviors observed with infant. Patient's  present this shift.     Plan: Continue current plan of care. Anticipate discharge on 11/23.

## 2023-11-22 NOTE — PROGRESS NOTES
Assumed patient assignment at 0545. Introduction done. Patient denied having pain or any other complaints. Patient was assisted up to bathroom to void and was able to void 150. Patient desires to sit longer. Discussed postpartum bladder management. Unable to bladder scan bladder as patient went to NICU. Relayed to oncoming nurse to have patient attempt to void upon returning to unit.

## 2023-11-22 NOTE — PLAN OF CARE
Bedside handoff report given to Chris HOPKINS RN at 2304 with Dr. Ellis present.    Provider aware of minimal variability since SROM at  - IVF bolus infusing, provided patient juice, and are repositioning patient. Nitrous oxide set up in patients room, education provided. Patient has not used it yet. Spouse is at bedside and is supportive. Moving towards .     Problem: Labor  Goal: Stable Fetal Wellbeing  2023 by Sharon Reyez RN  Outcome: Progressing     Problem: Labor  Goal: Effective Progression to Delivery  2023 by Sharon Reyez, RN  Outcome: Progressing     Problem: Labor  Goal: Absence of Infection Signs and Symptoms  2023 by Sharon Reyez, RN  Outcome: Progressing     Problem: Labor  Goal: Acceptable Pain Control  2023 by Sharon Reyez, RN  Outcome: Progressing    Problem: Labor  Goal: Normal Uterine Contraction Pattern  2023 by Sharon Reyez, RN  Outcome: Progressing

## 2023-11-22 NOTE — L&D DELIVERY NOTE
OB Vaginal Delivery Note    Dina Pena MRN# [id number]   Age: 33 year old YOB: 1990       GA: 34w6d  GP:   Labor Complications:  labor with dysfunctional labor pattern.  EBL: 500 mL  Delivery QBL: 625 mL  Delivery Type: Vaginal spontaneous  ROM to Delivery Time: (Delivered) Hours: 4 Minutes: 18   Weight: 5lb 14 oz   1 Minute 5 Minute 10 Minute   Apgar Totals: 7 9         Delivery Details:  Dina Pena, a 33 year old  female delivered a viable infant with apgars of 7 and 9. Patient was fully dilated and pushing after 4 hours in active labor.  delivery was called and present in the room at the time of delivery.  Delivery was via  on a sterile field under nitrous oxide anesthesia. Infant male delivered in vertex position. Anterior and posterior shoulders delivered without difficulty. Infant breathed and cried spontaneously.  The cord was clamped, cut twice and 3 vessels were noted.     Cord complications: short cord.  Placenta delivered at 0242. Placental disposition was Hospital disposal . Fundal massage performed and fundus found to be firm.     Episiotomy: None  Perineum, vagina, cervix were inspected, and the following lacerations were noted:   Right labial laceration tracking down to right vaginal tear at 5 oclock.  Right 1 cm tear outside labial minora.    Any lacerations were repaired in the usual fashion using 4-0 vicryl x 2.  3-0 vicryl x 1..    Excellent hemostasis was noted. Needle count correct. After brief skin to skin time, infant was transferred to NICU for further care due to prematurity.     Grant Pena [[id number]]      Labor Event Times      Latent labor onset date/time: 2023 2100    Active labor onset date: 23 Onset time: 12:10 AM   Dilation complete date: 23 Complete time:  1:57 AM   Start pushing date/time: 2023 0201          Labor Events     labor?: Yes   steroids: Full  Course  Labor Type: Spontaneous     Rupture identifier: Sac 1  Rupture date/time: 23   Rupture type: Spontaneous Rupture of Membranes  Fluid color: Clear  Fluid odor: Normal     Augmentation: None       Delivery/Placenta Date and Time      Delivery Date: 23 Delivery Time:  2:20 AM   Placenta Date/Time: 2023  2:42 AM  Oxytocin given at the time of delivery: after delivery of baby  Delivering clinician: Tianna Ellis MD   Other personnel present at delivery:  Provider Role   Chris Martínez RN Laws, Samantha, RN Donner, Katie J, RN Narow, Jana Gill, APRN CNP Nurse Practitioner             Vaginal Counts       Initial count performed by 2 team members:  Two Team Members   dr. Caleb doshi         Needles Suture Needles Sponges (RETIRED) Instruments   Initial counts 0 0 5    Added to count       Relief counts 1 2     Final counts 1 2 5            Placed during labor Accounted for at the end of labor   FSE NA NA   IUPC NA NA   Cervidil NA NA                  Final count performed by 2 team members:  Two Team Members   dr. Caleb doshi      Final count correct?: Yes      Pre-Birth Team Brief: Complete  Post-Birth Team Debrief: Complete       Apgars    Living status: Living   1 Minute 5 Minute 10 Minute 15 Minute 20 Minute   Skin color: 1  1       Heart rate: 2  2       Reflex irritability: 2  2       Muscle tone: 1  2       Respiratory effort: 1  2       Total: 7  9       Apgars assigned by: ROD CROCKER       Cord      Vessels: 3 Vessels    Cord Complications: None               Cord Blood Disposition: Lab    Gases Sent?: No    Delayed cord clamping?: Yes    Cord Clamping Delay (seconds):  seconds            Stem cell collection?: No            Resuscitation    Methods: Suctioning, Oximetry, Alf Puff       [place] Care at Delivery: NNP at delivery for 34 5/7 week gestation. Infant cried with drying on mother, bulb suctioned. Cord clamped at 60 seconds and baby brought to  "warmer. Given routine stimulation and drying. Lusty cry but decreased air entry and given 2  minutes Neop[place]  until opened up. Vigorous with lusty cry following. Transferred to NICU for prematurity.  Jana Priest ROD 23  0235am        Measurements      Weight: 5 lb 14.2 oz Length: 1' 7.5\"     Head circumference: 32.4 cm           Skin to Skin and Feeding Plan      Skin to skin initiation date/time: 1841    Skin to skin with: Mother  Skin to skin end date/time: 1841           Labor Events and Shoulder Dystocia    Fetal Tracing Prior to Delivery: Category 2  Fetal Tracing Comments: variables with contractions  Shoulder dystocia present?: Neg                 Delivery (Maternal) (Provider to Complete) ([id number])    Episiotomy: None      Perineal lacerations: None      Labial laceration: right Repaired?: Yes     Vaginal laceration?: Yes    Est. blood loss (mL): 500  Repair suture: 4-0 Vicryl, 3-0 Vicryl  Number of repair packets: 3  Genital tract inspection done: Pos       Blood Loss  Mother: Dina Pena #[id number]     Start of Mother's Information      Delivery Blood Loss  23 0010 - 23 0410      EBL (mL) Hospital Encounter 500 mL    Delivery QBL (mL) Hospital Encounter 625 mL    Total  1125 mL               End of Mother's Information  Mother: Dina Pena #[id number]                Delivery - Provider to Complete ([id number])    Delivering clinician: Tianna Ellis MD  Delivery Type (Choose the 1 that will go to the Birth History): Vaginal, Spontaneous                         Other personnel:  Provider Role   Chris Martínez RN Laws, Samantha, RN Donner, Katie J, RN Narow, Terri Lynn, APRN CNP Nurse Practitioner                    Placenta    Date/Time: 2023  2:42 AM  Removal: Spontaneous  Disposition: Hospital disposal             Presentation and Position    Presentation: Vertex    Position: Right Occiput Transverse                     Tianna Ellis MD    "

## 2023-11-22 NOTE — PLAN OF CARE
Goal Outcome Evaluation:       Pt's vs and assessments stable following NVD.  Bleeding minimal.fundus firm and midline. Pt up the bathroom but not able void. Pt ambulated to ICU to see baby.

## 2023-11-22 NOTE — PROVIDER NOTIFICATION
Updated Dr. Trevizo of [facility] at 2202, moderate amount that's clear and normal fluid, SVE is unchanged, and orders placed to start penicillin.    Dr. Ellis is coming in to the hospital to evaluate patient.

## 2023-11-23 VITALS
SYSTOLIC BLOOD PRESSURE: 115 MMHG | WEIGHT: 170.1 LBS | TEMPERATURE: 97.9 F | HEIGHT: 69 IN | HEART RATE: 84 BPM | DIASTOLIC BLOOD PRESSURE: 65 MMHG | BODY MASS INDEX: 25.2 KG/M2 | RESPIRATION RATE: 16 BRPM | OXYGEN SATURATION: 99 %

## 2023-11-23 PROCEDURE — 250N000013 HC RX MED GY IP 250 OP 250 PS 637: Performed by: FAMILY MEDICINE

## 2023-11-23 RX ORDER — VALACYCLOVIR HYDROCHLORIDE 500 MG/1
500 TABLET, FILM COATED ORAL DAILY
Qty: 30 TABLET | Refills: 0 | Status: SHIPPED | OUTPATIENT
Start: 2023-11-24

## 2023-11-23 RX ORDER — MODIFIED LANOLIN
OINTMENT (GRAM) TOPICAL
COMMUNITY
Start: 2023-11-23

## 2023-11-23 RX ORDER — IBUPROFEN 800 MG/1
800 TABLET, FILM COATED ORAL EVERY 6 HOURS PRN
Qty: 30 TABLET | Refills: 0 | Status: SHIPPED | OUTPATIENT
Start: 2023-11-23

## 2023-11-23 RX ADMIN — DOCUSATE SODIUM 100 MG: 100 CAPSULE, LIQUID FILLED ORAL at 08:49

## 2023-11-23 RX ADMIN — ACETAMINOPHEN 650 MG: 325 TABLET ORAL at 02:26

## 2023-11-23 RX ADMIN — IBUPROFEN 800 MG: 800 TABLET ORAL at 02:26

## 2023-11-23 RX ADMIN — VALACYCLOVIR HYDROCHLORIDE 500 MG: 500 TABLET, FILM COATED ORAL at 08:49

## 2023-11-23 ASSESSMENT — ACTIVITIES OF DAILY LIVING (ADL)
ADLS_ACUITY_SCORE: 18

## 2023-11-23 NOTE — PROVIDER NOTIFICATION
Notified MD at 0255 AM regarding changes in vital signs.      Spoke with: Dr. Katz, ok to continue to monitor, patient may just be sleepy. Encourage fluids overnight.    Orders were obtained.    Comments: Pt's BP's have been 87/52 and 86/45. Pt is asymptomatic, up independently. Hgb was 12.2 then 10.6.

## 2023-11-23 NOTE — PLAN OF CARE
Data: Vital signs within normal limits. Postpartum checks within normal limits - see flow record. Patient eating and drinking normally. Patient able to empty bladder independently and is up ambulating. Patient performing self cares, and showering this shift. Encouraged to soak in the tub with warm water to promote perineal healing and comfort. Pumping every 3 hours and bringing breastmilk to NICU. Using medicated pads and ice for discomfort. Patient's cold sore on lip appears to be healing well.     Action: Patient medicated with ibuprofen and tylenol during the shift for pain. See MAR. Adequate pain control noted by patient. Patient education done, see flow record.    Response: Positive attachment behaviors observed when speaking of infant in NICU. Patient's  and mother-in-law present this shift.     Plan: Continue current plan of care.  After discussion with Dina about preference for discharge, anticipate discharge on 11/24.     Problem: Postpartum (Vaginal Delivery)  Goal: Successful Parent Role Transition  11/22/2023 1821 by Sarai Cat, RN  Outcome: Progressing  11/22/2023 1821 by Sarai Cat, RN  Outcome: Progressing  Intervention: Support Parent Role Transition  Recent Flowsheet Documentation  Taken 11/22/2023 1700 by Sarai Cat, RN  Supportive Measures: active listening utilized

## 2023-11-23 NOTE — DISCHARGE SUMMARY
Maternal Discharge Summary  [facility]  Date of Service: 2023    Name      Dina Pena         1990  MRN       [id number]  PCP        Dr. Riojas [facility] ________________________________________________________________________    Assessment:  Dina Pena is a 33 year old now  s/p  @ at 34w6d on 24.      Discharge Plan:   Discharge to Home. Condition at Discharge:  stable  Physical activity: Regular.  Diet:  Regular.  Home care nurse: not indicated  Lactation clinic appointment:  at [facility] prn .  Follow up with Dr. Riojas, [facility] in 2 weeks and 6 weeks for routine postpartum care.  No future appointments.   ________________________________________________________________________    Admission Date:  2023  Discharge Date:  2023  Delivery Date:  2024  Gestational Age at Delivery: 34w6d    Principal Diagnosis: Labor and delivery  Delivery type: @DELRECITEM(hospitals,13792,,1,,)@  Principal Problem:     labor  Active Problems:    Pregnant      Subjective:  Patient denies SOB, CP, lightheadedness, increased bleeding.    Admitted for  labor 2023 with advance cervical dilation. Received course of antibiotics.  Dysfunctional labor pattern with expectant management.  Labor onset pm of  with  on  at 34w6d gestation.  Right labial tear healing well.  Mild PPH, resolved with HGB 10.6 on discharge.  Pumping and bottle feeding baby in NICU    Discharge Exam:  Patient Vitals for the past 24 hrs:   BP Temp Temp src Pulse Resp SpO2   23 0828 118/64 98.3  F (36.8  C) Oral 84 24 98 %   23 (!) 86/45 97.8  F (36.6  C) -- 61 24 96 %   23 (!) 87/52 98.2  F (36.8  C) -- 65 20 98 %   23 1728 113/65 98.2  F (36.8  C) Oral 76 16 98 %   23 1210 102/74 97.5  F (36.4  C) Axillary 74 16 98 %     General - alert, comfortable  Heart -  RRR, no murmurs  Lungs - CTA bilaterally  Abdomen - fundus firm, nontender, below umbilicus  Extremities - trace edema  Admission on 11/09/2023   Component Date Value Ref Range Status    Color Urine 11/09/2023 Light Yellow  Colorless, Straw, Light Yellow, Yellow Final    Appearance Urine 11/09/2023 Clear  Clear Final    Glucose Urine 11/09/2023 Negative  Negative mg/dL Final    Bilirubin Urine 11/09/2023 Negative  Negative Final    Ketones Urine 11/09/2023 Negative  Negative mg/dL Final    Specific Gravity Urine 11/09/2023 1.018  1.001 - 1.030 Final    Blood Urine 11/09/2023 Negative  Negative Final    pH Urine 11/09/2023 7.0  5.0 - 7.0 Final    Protein Albumin Urine 11/09/2023 Negative  Negative mg/dL Final    Urobilinogen Urine 11/09/2023 <2.0  <2.0 mg/dL Final    Nitrite Urine 11/09/2023 Negative  Negative Final    Leukocyte Esterase Urine 11/09/2023 Negative  Negative Final    Mucus Urine 11/09/2023 Present (A)  None Seen /LPF Final    RBC Urine 11/09/2023 0  <=2 /HPF Final    WBC Urine 11/09/2023 <1  <=5 /HPF Final    Squamous Epithelials Urine 11/09/2023 1  <=1 /HPF Final    Trichomonas 11/09/2023 Absent  Absent Final    Yeast 11/09/2023 Present (A)  Absent Final    Clue Cells 11/09/2023 Absent  Absent Final    WBCs/high power field 11/09/2023 3+ (A)  None Final    Sodium 11/09/2023 135  135 - 145 mmol/L Final    Reference intervals for this test were updated on 09/26/2023 to more accurately reflect our healthy population. There may be differences in the flagging of prior results with similar values performed with this method. Interpretation of those prior results can be made in the context of the updated reference intervals.     Potassium 11/09/2023 3.3 (L)  3.4 - 5.3 mmol/L Final    Carbon Dioxide (CO2) 11/09/2023 19 (L)  22 - 29 mmol/L Final    Anion Gap 11/09/2023 12  7 - 15 mmol/L Final    Urea Nitrogen 11/09/2023 9.5  6.0 - 20.0 mg/dL Final    Creatinine 11/09/2023 0.47 (L)  0.51 - 0.95 mg/dL Final     GFR Estimate 11/09/2023 >90  >60 mL/min/1.73m2 Final    Calcium 11/09/2023 9.0  8.6 - 10.0 mg/dL Final    Chloride 11/09/2023 104  98 - 107 mmol/L Final    Glucose 11/09/2023 90  70 - 99 mg/dL Final    Alkaline Phosphatase 11/09/2023 77  35 - 104 U/L Final    AST 11/09/2023 16  0 - 45 U/L Final    Reference intervals for this test were updated on 6/12/2023 to more accurately reflect our healthy population. There may be differences in the flagging of prior results with similar values performed with this method. Interpretation of those prior results can be made in the context of the updated reference intervals.    ALT 11/09/2023 12  0 - 50 U/L Final    Reference intervals for this test were updated on 6/12/2023 to more accurately reflect our healthy population. There may be differences in the flagging of prior results with similar values performed with this method. Interpretation of those prior results can be made in the context of the updated reference intervals.      Protein Total 11/09/2023 6.7  6.4 - 8.3 g/dL Final    Albumin 11/09/2023 3.6  3.5 - 5.2 g/dL Final    Bilirubin Total 11/09/2023 0.6  <=1.2 mg/dL Final    WBC Count 11/09/2023 12.5 (H)  4.0 - 11.0 10e3/uL Final    RBC Count 11/09/2023 4.22  3.80 - 5.20 10e6/uL Final    Hemoglobin 11/09/2023 13.1  11.7 - 15.7 g/dL Final    Hematocrit 11/09/2023 37.9  35.0 - 47.0 % Final    MCV 11/09/2023 90  78 - 100 fL Final    MCH 11/09/2023 31.0  26.5 - 33.0 pg Final    MCHC 11/09/2023 34.6  31.5 - 36.5 g/dL Final    RDW 11/09/2023 13.5  10.0 - 15.0 % Final    Platelet Count 11/09/2023 206  150 - 450 10e3/uL Final    Treponema Antibody Total 11/09/2023 Nonreactive  Nonreactive Final    ABO/RH(D) 11/09/2023 A POS   Final    Antibody Screen 11/09/2023 Negative  Negative Final    SPECIMEN EXPIRATION DATE 11/09/2023 [id number]   Final    Sodium 11/10/2023 135  135 - 145 mmol/L Final    Reference intervals for this test were updated on 09/26/2023 to more accurately  reflect our healthy population. There may be differences in the flagging of prior results with similar values performed with this method. Interpretation of those prior results can be made in the context of the updated reference intervals.     Potassium 11/10/2023 4.4  3.4 - 5.3 mmol/L Final    Chloride 11/10/2023 105  98 - 107 mmol/L Final    Carbon Dioxide (CO2) 11/10/2023 22  22 - 29 mmol/L Final    Anion Gap 11/10/2023 8  7 - 15 mmol/L Final    Urea Nitrogen 11/10/2023 7.4  6.0 - 20.0 mg/dL Final    Creatinine 11/10/2023 0.58  0.51 - 0.95 mg/dL Final    GFR Estimate 11/10/2023 >90  >60 mL/min/1.73m2 Final    Calcium 11/10/2023 8.5 (L)  8.6 - 10.0 mg/dL Final    Glucose 11/10/2023 133 (H)  70 - 99 mg/dL Final    Blood Component Type 11/10/2023 Red Blood Cells   Preliminary    Product Code 11/10/2023 [id number]   Preliminary    Unit Status 11/10/2023 Released   Preliminary    Unit Number 11/10/2023 [id number]   Preliminary    CROSSMATCH 11/10/2023 COMPATIBLE   Preliminary    CODING SYSTEM 11/10/2023 [id number]   Preliminary    UNIT ABO/RH 11/10/2023 A+   Preliminary    UNIT TYPE ISBT 11/10/2023 6200   Preliminary    Blood Component Type 11/10/2023 Red Blood Cells   Preliminary    Product Code 11/10/2023 [id number]   Preliminary    Unit Status 11/10/2023 Released   Preliminary    Unit Number 11/10/2023 [id number]   Preliminary    CROSSMATCH 11/10/2023 COMPATIBLE   Preliminary    CODING SYSTEM 11/10/2023 [id number]   Preliminary    UNIT ABO/RH 11/10/2023 A+   Preliminary    UNIT TYPE ISBT 11/10/2023 6200   Preliminary    Hold Specimen 11/10/2023 [facility]   Final    WBC Count 11/17/2023 9.4  4.0 - 11.0 10e3/uL Final    RBC Count 11/17/2023 3.71 (L)  3.80 - 5.20 10e6/uL Final    Hemoglobin 11/17/2023 11.4 (L)  11.7 - 15.7 g/dL Final    Hematocrit 11/17/2023 33.7 (L)  35.0 - 47.0 % Final    MCV 11/17/2023 91  78 - 100 fL Final    MCH 11/17/2023 30.7  26.5 - 33.0 pg Final    MCHC 11/17/2023 33.8  31.5 - 36.5 g/dL Final    RDW  11/17/2023 13.2  10.0 - 15.0 % Final    Platelet Count 11/17/2023 186  150 - 450 10e3/uL Final    ABO/RH(D) 11/17/2023 A POS   Final    Antibody Screen 11/17/2023 Negative  Negative Final    SPECIMEN EXPIRATION DATE 11/17/2023 [id number]   Final    Blood Component Type 11/17/2023 Red Blood Cells   Preliminary    Product Code 11/17/2023 [id number]   Preliminary    Unit Status 11/17/2023 Released   Preliminary    Unit Number 11/17/2023 [id number]   Preliminary    CROSSMATCH 11/17/2023 COMPATIBLE   Preliminary    CODING SYSTEM 11/17/2023 [id number]   Preliminary    UNIT ABO/RH 11/17/2023 A+   Preliminary    UNIT TYPE ISBT 11/17/2023 6200   Preliminary    Blood Component Type 11/17/2023 Red Blood Cells   Preliminary    Product Code 11/17/2023 [id number]   Preliminary    Unit Status 11/17/2023 Released   Preliminary    Unit Number 11/17/2023 [id number]   Preliminary    CROSSMATCH 11/17/2023 COMPATIBLE   Preliminary    CODING SYSTEM 11/17/2023 [id number]   Preliminary    UNIT ABO/RH 11/17/2023 A+   Preliminary    UNIT TYPE ISBT 11/17/2023 6200   Preliminary    WBC Count 11/18/2023 9.1  4.0 - 11.0 10e3/uL Final    RBC Count 11/18/2023 3.77 (L)  3.80 - 5.20 10e6/uL Final    Hemoglobin 11/18/2023 11.6 (L)  11.7 - 15.7 g/dL Final    Hematocrit 11/18/2023 34.0 (L)  35.0 - 47.0 % Final    MCV 11/18/2023 90  78 - 100 fL Final    MCH 11/18/2023 30.8  26.5 - 33.0 pg Final    MCHC 11/18/2023 34.1  31.5 - 36.5 g/dL Final    RDW 11/18/2023 13.1  10.0 - 15.0 % Final    Platelet Count 11/18/2023 207  150 - 450 10e3/uL Final    Hold Specimen 11/18/2023 JIC   Final    WBC Count 11/19/2023 8.2  4.0 - 11.0 10e3/uL Final    RBC Count 11/19/2023 3.72 (L)  3.80 - 5.20 10e6/uL Final    Hemoglobin 11/19/2023 11.4 (L)  11.7 - 15.7 g/dL Final    Hematocrit 11/19/2023 33.4 (L)  35.0 - 47.0 % Final    MCV 11/19/2023 90  78 - 100 fL Final    MCH 11/19/2023 30.6  26.5 - 33.0 pg Final    MCHC 11/19/2023 34.1  31.5 - 36.5 g/dL Final    RDW 11/19/2023  13.1  10.0 - 15.0 % Final    Platelet Count 11/19/2023 203  150 - 450 10e3/uL Final    WBC Count 11/21/2023 10.6  4.0 - 11.0 10e3/uL Final    RBC Count 11/21/2023 3.93  3.80 - 5.20 10e6/uL Final    Hemoglobin 11/21/2023 12.2  11.7 - 15.7 g/dL Final    Hematocrit 11/21/2023 35.2  35.0 - 47.0 % Final    MCV 11/21/2023 90  78 - 100 fL Final    MCH 11/21/2023 31.0  26.5 - 33.0 pg Final    MCHC 11/21/2023 34.7  31.5 - 36.5 g/dL Final    RDW 11/21/2023 13.1  10.0 - 15.0 % Final    Platelet Count 11/21/2023 192  150 - 450 10e3/uL Final    ABO/RH(D) 11/21/2023 A POS   Final    Antibody Screen 11/21/2023 Negative  Negative Final    SPECIMEN EXPIRATION DATE 11/21/2023 [id number]   Final    Hold Specimen 11/21/2023 [facility]   Final    Hemoglobin 11/22/2023 10.6 (L)  11.7 - 15.7 g/dL Final      Discharge Medications: See medication reconciliation.  Immunizations Administered for This Admission       Name Date Dose VIS Date Route    INFLUENZA VACCINE >6 MONTHS, GRIFFIN,PF 11/17/23  8:04 PM 0.5 mL 08/06/2021, Given Today Intramuscular           Completed by:   Rebecca Bellamy MD  [facility]  11/23/2023 10:21 AM   used: Not needed.

## 2023-11-23 NOTE — LACTATION NOTE
Lactation to Dina's pt room to discuss the benefits of renting a hospital grade breast pump for discharge. Her  is  in the NICU. Dina agreed and the rental pump was distributed (paperwork completed).

## 2023-11-23 NOTE — PLAN OF CARE
Problem: Adult Inpatient Plan of Care  Goal: Optimal Comfort and Wellbeing  Outcome: Progressing  Intervention: Monitor Pain and Promote Comfort  Recent Flowsheet Documentation  Taken 11/22/2023 2104 by Hannah Milan RN  Pain Management Interventions: medication (see MAR)  Intervention: Provide Person-Centered Care  Recent Flowsheet Documentation  Taken 11/22/2023 2104 by Hannah Milan, RN  Trust Relationship/Rapport:   care explained   choices provided   questions answered   questions encouraged   thoughts/feelings acknowledged   Goal Outcome Evaluation:    Hypotensive but asymptomatic on shift, MD aware. Pain controlled with Tylenol and Ibuprofen. Pumping and bringing milk to NICU.

## 2023-11-24 ENCOUNTER — LACTATION ENCOUNTER (OUTPATIENT)
Age: 33
End: 2023-11-24

## 2023-11-24 NOTE — PLAN OF CARE
Goal Outcome Evaluation: Ready for discharge      Plan of Care Reviewed With: patient    Overall Patient Progress: improvingOverall Patient Progress: improving    Outcome Evaluation: Patient stable.  She will discharge this evening.    Dina's VSS.  FFU/2; light rubra lochia; no clots.  She's independent with self cares; voiding without difficulty; independent ambulating to NICU to hold and feed her baby.  She denies pain and has declined pain meds.  Dina is pumping and getting 5-10 ml colostrum which she takes to NICU for baby.  She also reports that she's breastfeeding in NICU and baby is doing well.  She understands the need to continue pumping and breastfeeding both. She was given a hospital grade rental pump for home use.    Dina was given written and verbal discharge instructions and danger signs.  She verbalized understanding and had her questions answered.  The Postpartum Care / Baby Care booklet was also reviewed with her.  She plans to stay with baby at the hospital until the completion of his 1800 feeding.  Then will discharge to home, to return at intervals to be with her baby and bring in breast milk for him.      Problem: Breastfeeding  Goal: Effective Breastfeeding  Outcome: Met  Intervention: Promote Breast Care and Comfort  Recent Flowsheet Documentation  Taken 11/23/2023 1600 by Stephanie James RN  Breast Care: double electric breast pump utilized  Intervention: Promote Effective Breastfeeding  Recent Flowsheet Documentation  Taken 11/23/2023 1600 by Stephanie James RN  Parent-Child Attachment Promotion: (Baby in NICU; seeing/holding baby encouraged/supported) other (see comments)  Intervention: Support Exclusive Breastfeeding Success  Recent Flowsheet Documentation  Taken 11/23/2023 1600 by Stephanie James RN  Supportive Measures:   active listening utilized   counseling provided   decision-making supported   positive reinforcement provided     Problem: Postpartum (Vaginal Delivery)  Goal: Successful  Parent Role Transition  Outcome: Met  Intervention: Support Parent Role Transition  Recent Flowsheet Documentation  Taken 11/23/2023 1600 by Stephanie James RN  Supportive Measures:   active listening utilized   counseling provided   decision-making supported   positive reinforcement provided  Parent-Child Attachment Promotion: (Baby in NICU; seeing/holding baby encouraged/supported) other (see comments)  Goal: Hemostasis  Outcome: Met  Goal: Absence of Infection Signs and Symptoms  Outcome: Met  Intervention: Prevent or Manage Infection  Recent Flowsheet Documentation  Taken 11/23/2023 1600 by Stephanie James RN  Infection Management: aseptic technique maintained  Goal: Anesthesia/Sedation Recovery  Outcome: Met  Goal: Optimal Pain Control and Function  Outcome: Met  Intervention: Prevent or Manage Pain  Recent Flowsheet Documentation  Taken 11/23/2023 1600 by Stephanie James RN  Perineal Care:   absorbent brief/pad changed   perineal spray bottle/warm water use encouraged  Goal: Effective Urinary Elimination  Outcome: Met

## 2023-11-24 NOTE — LACTATION NOTE
This note was copied from a baby's chart.  This writer met with Dina to offer lactation services, per lactation order.  She reports seeing lactation yesterday.  Infant is having weighted feedings, starting today and Dina reports infant is doing well.  Dina reports she breast fed her 19 month old child with no issues.  We discussed how this infant has thinner fat pads in his cheeks.  He may be sucking but need to assure he is transferring milk from the breast.  If infant does some breastfeeding, then falls sleep, then needs a bottle to supplement, then Dina to pump her breasts.  Bottle=pump.  Gave anticipatory guidance.  Some feeds may go well while the next feeding infant may not latch at all and need a total supplementation.  As infant grows stronger and weighs more, he will be more consistent at sustaining a latch and transferring milk well from the breast.  In the meantime, Dina to offer supplement (if MD orders) to infant and pump her breasts after every breastfeeding attempt.  Encouraged follow up with outpatient lactation clinic, prn.  She was given the phone number to schedule an appointment, prn.  Dina verbalizes understanding of all education given.  She denies any further questions.

## 2025-04-01 ENCOUNTER — TRANSFERRED RECORDS (OUTPATIENT)
Dept: HEALTH INFORMATION MANAGEMENT | Facility: CLINIC | Age: 35
End: 2025-04-01
Payer: COMMERCIAL

## 2025-04-04 ENCOUNTER — MEDICAL CORRESPONDENCE (OUTPATIENT)
Dept: HEALTH INFORMATION MANAGEMENT | Facility: CLINIC | Age: 35
End: 2025-04-04
Payer: COMMERCIAL

## 2025-04-08 DIAGNOSIS — Z87.51 HISTORY OF PRETERM DELIVERY: Primary | ICD-10-CM

## 2025-08-13 ENCOUNTER — APPOINTMENT (OUTPATIENT)
Dept: URBAN - METROPOLITAN AREA CLINIC 260 | Age: 35
Setting detail: DERMATOLOGY
End: 2025-08-13

## 2025-08-13 VITALS — HEIGHT: 69 IN | WEIGHT: 140 LBS

## 2025-08-13 DIAGNOSIS — L70.0 ACNE VULGARIS: ICD-10-CM

## 2025-08-13 RX ORDER — SPIRONOLACTONE 50 MG/1
TABLET, FILM COATED ORAL
Qty: 180 | Refills: 1 | Status: ERX | COMMUNITY
Start: 2025-08-13

## 2025-08-13 ASSESSMENT — LOCATION ZONE DERM: LOCATION ZONE: FACE

## 2025-08-13 ASSESSMENT — LOCATION SIMPLE DESCRIPTION DERM: LOCATION SIMPLE: LEFT CHEEK

## 2025-08-13 ASSESSMENT — LOCATION DETAILED DESCRIPTION DERM: LOCATION DETAILED: LEFT INFERIOR CENTRAL MALAR CHEEK

## (undated) DEVICE — SUCTION TIP YANKAUER W/O VENT K86

## (undated) DEVICE — NDL COUNTER 20CT 31142493

## (undated) DEVICE — PREP POVIDONE IODINE USP 10% TOPICAL SOL 64537161

## (undated) DEVICE — DRAPE POUCH IRR 1016

## (undated) DEVICE — SOL NACL 0.9% IRRIG 1000ML BOTTLE 2F7124

## (undated) DEVICE — SYR BULB IRRIG 50ML LATEX FREE 0035280

## (undated) DEVICE — SPONGE RAY-TEC 4X8" 7318

## (undated) DEVICE — LIGHT HANDLE X2

## (undated) DEVICE — CATH TRAY FOLEY 16FR BARDEX W/DRAIN BAG STATLOCK 300316A

## (undated) DEVICE — DRAPE GYN/UROLOGY FLUID POUCH TUR 29455

## (undated) DEVICE — LUBRICATING JELLY 2.7GM T00137

## (undated) DEVICE — PREP POVIDONE IODINE USP 7.5% CLEANING SOL 64538161

## (undated) DEVICE — Device

## (undated) DEVICE — SOL WATER IRRIG 1000ML BOTTLE 07139-09

## (undated) DEVICE — TUBING SUCTION 10'X3/16" N510

## (undated) DEVICE — PREP SKIN SCRUB TRAY 4461A

## (undated) RX ORDER — ONDANSETRON 2 MG/ML
INJECTION INTRAMUSCULAR; INTRAVENOUS
Status: DISPENSED
Start: 2023-08-14